# Patient Record
Sex: MALE | Race: WHITE | NOT HISPANIC OR LATINO | Employment: PART TIME | URBAN - METROPOLITAN AREA
[De-identification: names, ages, dates, MRNs, and addresses within clinical notes are randomized per-mention and may not be internally consistent; named-entity substitution may affect disease eponyms.]

---

## 2017-02-09 ENCOUNTER — OFFICE VISIT (OUTPATIENT)
Dept: FAMILY MEDICINE | Facility: HOSPITAL | Age: 57
End: 2017-02-09
Attending: FAMILY MEDICINE
Payer: MEDICAID

## 2017-02-09 VITALS
HEART RATE: 67 BPM | WEIGHT: 239.44 LBS | BODY MASS INDEX: 30.73 KG/M2 | DIASTOLIC BLOOD PRESSURE: 79 MMHG | HEIGHT: 74 IN | SYSTOLIC BLOOD PRESSURE: 128 MMHG

## 2017-02-09 DIAGNOSIS — E78.5 HYPERLIPIDEMIA ASSOCIATED WITH TYPE 2 DIABETES MELLITUS: ICD-10-CM

## 2017-02-09 DIAGNOSIS — Z79.4 TYPE 2 DIABETES MELLITUS WITHOUT COMPLICATION, WITH LONG-TERM CURRENT USE OF INSULIN: Primary | ICD-10-CM

## 2017-02-09 DIAGNOSIS — I15.2 HYPERTENSION ASSOCIATED WITH DIABETES: ICD-10-CM

## 2017-02-09 DIAGNOSIS — E11.9 TYPE 2 DIABETES MELLITUS WITHOUT COMPLICATION, WITH LONG-TERM CURRENT USE OF INSULIN: Primary | ICD-10-CM

## 2017-02-09 DIAGNOSIS — E11.69 HYPERLIPIDEMIA ASSOCIATED WITH TYPE 2 DIABETES MELLITUS: ICD-10-CM

## 2017-02-09 DIAGNOSIS — E11.59 HYPERTENSION ASSOCIATED WITH DIABETES: ICD-10-CM

## 2017-02-09 PROCEDURE — 99213 OFFICE O/P EST LOW 20 MIN: CPT | Performed by: FAMILY MEDICINE

## 2017-02-09 RX ORDER — HYDROCODONE BITARTRATE AND ACETAMINOPHEN 10; 325 MG/1; MG/1
TABLET ORAL
COMMUNITY
Start: 2016-12-05 | End: 2017-05-03

## 2017-02-09 NOTE — PROGRESS NOTES
Subjective:       Patient ID: Med Sam is a 56 y.o. male.    Chief Complaint: Follow-up (6 months check up)    Hypertension   This is a chronic problem. The current episode started more than 1 year ago. The problem is controlled. Pertinent negatives include no anxiety, blurred vision, chest pain, headaches, malaise/fatigue, neck pain, orthopnea, palpitations, peripheral edema, PND, shortness of breath or sweats. Risk factors for coronary artery disease include diabetes mellitus, dyslipidemia and obesity. Past treatments include angiotensin blockers. The current treatment provides significant improvement. There are no compliance problems.  There is no history of chronic renal disease.   Diabetes   He presents for his follow-up diabetic visit. He has type 2 diabetes mellitus. Pertinent negatives for hypoglycemia include no headaches or sweats. Pertinent negatives for diabetes include no blurred vision, no chest pain, no fatigue, no foot paresthesias, no foot ulcerations, no polydipsia, no polyphagia, no polyuria, no visual change, no weakness and no weight loss. There are no hypoglycemic complications. Symptoms are resolved. There are no diabetic complications. Risk factors for coronary artery disease include diabetes mellitus, dyslipidemia, male sex and obesity. Current diabetic treatment includes insulin injections and oral agent (monotherapy). He is compliant with treatment all of the time. He is currently taking insulin at bedtime. His weight is decreasing steadily. He is following a generally healthy diet. When asked about meal planning, he reported none. He has not had a previous visit with a dietitian. He rarely participates in exercise. He monitors blood glucose at home 5+ x per day. Blood glucose monitoring compliance is fair. There is no change in his home blood glucose trend. His breakfast blood glucose range is generally 110-130 mg/dl. His highest blood glucose is 110-130 mg/dl. An ACE  inhibitor/angiotensin II receptor blocker is being taken. He does not see a podiatrist.Eye exam is current.   Hyperlipidemia   This is a chronic problem. The current episode started more than 1 year ago. Recent lipid tests were reviewed and are variable. Exacerbating diseases include diabetes. He has no history of chronic renal disease, hypothyroidism, liver disease or obesity. There are no known factors aggravating his hyperlipidemia. Pertinent negatives include no chest pain or shortness of breath. Current antihyperlipidemic treatment includes statins. The current treatment provides significant improvement of lipids. There are no compliance problems.  Risk factors for coronary artery disease include diabetes mellitus, dyslipidemia, hypertension, male sex and obesity.        Review of Systems   Constitutional: Negative for chills, fatigue, fever, malaise/fatigue and weight loss.   HENT: Negative for hearing loss.    Eyes: Negative for blurred vision and visual disturbance.   Respiratory: Negative for shortness of breath.    Cardiovascular: Negative for chest pain, palpitations, orthopnea and PND.   Endocrine: Negative for polydipsia, polyphagia and polyuria.   Musculoskeletal: Negative for neck pain.   Neurological: Negative for weakness and headaches.   Psychiatric/Behavioral: Negative.    All other systems reviewed and are negative.      Past Medical History   Diagnosis Date    Depression      Dx 3 months ago.    Diabetes mellitus, type 2      Dx at age 46       No family history on file.    Social History     Social History    Marital status:      Spouse name: N/A    Number of children: N/A    Years of education: N/A     Social History Main Topics    Smoking status: Never Smoker    Smokeless tobacco: None    Alcohol use No    Drug use: No    Sexual activity: Not Asked     Other Topics Concern    None     Social History Narrative       Past Surgical History   Procedure Laterality Date     "Cataract extraction Right     Eye surgery       detached retina right eye    Colonoscopy N/A 10/24/2016     Procedure: COLONOSCOPY;  Surgeon: Forrest Damon MD;  Location: [facility];  Service: Endoscopy;  Laterality: N/A;  screening colonoscpy           Current Outpatient Prescriptions:     AFLURIA [id number], PF, 45 mcg (15 mcg x 3)/0.5 mL Syrg, , Disp: , Rfl:     benazepril (LOTENSIN) 5 MG tablet, Take 1 tablet (5 mg total) by mouth once daily., Disp: 30 tablet, Rfl: 3    blood sugar diagnostic Strp, 1 strip by Misc.(Non-Drug; Combo Route) route 2 (two) times daily before meals., Disp: 100 strip, Rfl: 6    blood-glucose meter kit, Use as instructed, Disp: 1 each, Rfl: 0    cholecalciferol, vitamin D3, 1,000 unit capsule, Take 1 capsule (1,000 Units total) by mouth once daily., Disp: 30 capsule, Rfl: 3    citalopram (CELEXA) 20 MG tablet, Take 1 tablet (20 mg total) by mouth once daily., Disp: 360 tablet, Rfl: 0    glimepiride (AMARYL) 4 MG tablet, Take 1 tablet (4 mg total) by mouth before breakfast., Disp: 30 tablet, Rfl: 2    hydrocodone-acetaminophen 10-325mg (NORCO)  mg Tab, , Disp: , Rfl:     insulin glargine (LANTUS SOLOSTAR) 100 unit/mL (3 mL) InPn pen, Inject 40 Units into the skin every evening., Disp: 12 mL, Rfl: 11    lancets (ACCU-CHEK SOFTCLIX LANCETS) Misc, 1 lancet by Misc.(Non-Drug; Combo Route) route 2 (two) times daily before meals., Disp: 100 each, Rfl: 6    lovastatin (MEVACOR) 20 MG tablet, Take 1 tablet (20 mg total) by mouth every evening., Disp: 30 tablet, Rfl: 3    metformin (GLUCOPHAGE) 500 MG tablet, Take 1 tablet (500 mg total) by mouth 2 (two) times daily with meals. Take two tablets by mouth once daily., Disp: 60 tablet, Rfl: 11    mupirocin (BACTROBAN) 2 % ointment, , Disp: , Rfl:     pen needle, diabetic 31 gauge x 1/4" Ndle, 40 Units by Misc.(Non-Drug; Combo Route) route every evening., Disp: 30 each, Rfl: 3    tadalafil (CIALIS) 10 MG tablet, Take 1 tablet " (10 mg total) by mouth daily as needed for Erectile Dysfunction. Max 10 mg in 24 hour period., Disp: 90 tablet, Rfl: 1    Objective:      Vitals:    02/09/17 0919   BP: 128/79   Pulse: 67     Physical Exam   Constitutional: He is oriented to person, place, and time. He appears well-developed and well-nourished.   HENT:   Head: Normocephalic and atraumatic.   Eyes: Conjunctivae and EOM are normal. Pupils are equal, round, and reactive to light.   Neck: Normal range of motion. Neck supple.   Cardiovascular: Normal rate, regular rhythm, normal heart sounds and intact distal pulses.    Pulmonary/Chest: Effort normal and breath sounds normal.   Abdominal: Soft. Bowel sounds are normal.   Musculoskeletal: Normal range of motion.   Neurological: He is alert and oriented to person, place, and time. He has normal reflexes.   Skin: Skin is warm and dry.   Psychiatric: He has a normal mood and affect. His behavior is normal. Judgment and thought content normal.   Nursing note and vitals reviewed.      Assessment:       1. Type 2 diabetes mellitus without complication, with long-term current use of insulin    2. Hypertension associated with diabetes    3. Hyperlipidemia associated with type 2 diabetes mellitus        Plan:       Type 2 diabetes mellitus without complication, with long-term current use of insulin  -     Hemoglobin A1c; Future; Expected date: 2/9/17  -     Microalbumin/creatinine urine ratio; Future; Expected date: 2/9/17  -     Basic metabolic panel; Future; Expected date: 2/9/17    Hypertension associated with diabetes        -    Controlled. Cont current management.    Hyperlipidemia associated with type 2 diabetes mellitus        -    Controlled.  Cont current management.    Return in about 4 weeks (around 3/9/2017).

## 2017-02-20 DIAGNOSIS — E11.69 HYPERLIPIDEMIA ASSOCIATED WITH TYPE 2 DIABETES MELLITUS: ICD-10-CM

## 2017-02-20 DIAGNOSIS — E78.5 HYPERLIPIDEMIA ASSOCIATED WITH TYPE 2 DIABETES MELLITUS: ICD-10-CM

## 2017-02-20 RX ORDER — LOVASTATIN 20 MG/1
TABLET ORAL
Qty: 30 TABLET | Refills: 0 | Status: SHIPPED | OUTPATIENT
Start: 2017-02-20 | End: 2017-05-03 | Stop reason: SDUPTHER

## 2017-03-29 RX ORDER — GLIMEPIRIDE 4 MG/1
4 TABLET ORAL
Qty: 30 TABLET | Refills: 2 | Status: SHIPPED | OUTPATIENT
Start: 2017-03-29 | End: 2017-05-03 | Stop reason: SDUPTHER

## 2017-03-29 RX ORDER — INSULIN GLARGINE 100 [IU]/ML
40 INJECTION, SOLUTION SUBCUTANEOUS NIGHTLY
Qty: 12 ML | Refills: 11 | Status: SHIPPED | OUTPATIENT
Start: 2017-03-29 | End: 2017-05-03 | Stop reason: SDUPTHER

## 2017-04-03 RX ORDER — GLIMEPIRIDE 4 MG/1
TABLET ORAL
Qty: 30 TABLET | Refills: 3 | Status: SHIPPED | OUTPATIENT
Start: 2017-04-03 | End: 2017-05-03

## 2017-04-03 RX ORDER — INSULIN GLARGINE 100 [IU]/ML
INJECTION, SOLUTION SUBCUTANEOUS
Qty: 15 ML | Refills: 0 | Status: SHIPPED | OUTPATIENT
Start: 2017-04-03 | End: 2017-05-03 | Stop reason: SDUPTHER

## 2017-05-03 ENCOUNTER — OFFICE VISIT (OUTPATIENT)
Dept: FAMILY MEDICINE | Facility: HOSPITAL | Age: 57
End: 2017-05-03
Attending: FAMILY MEDICINE
Payer: MEDICAID

## 2017-05-03 DIAGNOSIS — E78.5 HYPERLIPIDEMIA ASSOCIATED WITH TYPE 2 DIABETES MELLITUS: ICD-10-CM

## 2017-05-03 DIAGNOSIS — T75.3XXA MOTION SICKNESS, INITIAL ENCOUNTER: ICD-10-CM

## 2017-05-03 DIAGNOSIS — L24.7 CONTACT DERMATITIS AND ECZEMA DUE TO PLANT: Primary | ICD-10-CM

## 2017-05-03 DIAGNOSIS — M62.830 BACK SPASM: ICD-10-CM

## 2017-05-03 DIAGNOSIS — E11.69 HYPERLIPIDEMIA ASSOCIATED WITH TYPE 2 DIABETES MELLITUS: ICD-10-CM

## 2017-05-03 DIAGNOSIS — E55.9 VITAMIN D INSUFFICIENCY: ICD-10-CM

## 2017-05-03 PROCEDURE — 99213 OFFICE O/P EST LOW 20 MIN: CPT | Performed by: FAMILY MEDICINE

## 2017-05-03 RX ORDER — SCOLOPAMINE TRANSDERMAL SYSTEM 1 MG/1
1 PATCH, EXTENDED RELEASE TRANSDERMAL
Qty: 4 PATCH | Refills: 1 | Status: SHIPPED | OUTPATIENT
Start: 2017-05-03 | End: 2017-05-11 | Stop reason: CLARIF

## 2017-05-03 RX ORDER — PEN NEEDLE, DIABETIC 29 G X1/2"
40 NEEDLE, DISPOSABLE MISCELLANEOUS NIGHTLY
Qty: 30 EACH | Refills: 11 | Status: SHIPPED | OUTPATIENT
Start: 2017-05-03 | End: 2018-05-03

## 2017-05-03 RX ORDER — TIZANIDINE 4 MG/1
4 TABLET ORAL EVERY 8 HOURS
Qty: 21 TABLET | Refills: 0 | Status: SHIPPED | OUTPATIENT
Start: 2017-05-03 | End: 2017-05-16 | Stop reason: SDUPTHER

## 2017-05-03 RX ORDER — METFORMIN HYDROCHLORIDE 500 MG/1
TABLET ORAL
COMMUNITY
Start: 2017-03-26 | End: 2017-05-03 | Stop reason: DRUGHIGH

## 2017-05-03 RX ORDER — GLIMEPIRIDE 4 MG/1
4 TABLET ORAL
Qty: 90 TABLET | Refills: 0 | Status: SHIPPED | OUTPATIENT
Start: 2017-05-03 | End: 2017-07-24 | Stop reason: SDUPTHER

## 2017-05-03 RX ORDER — INSULIN GLARGINE 100 [IU]/ML
30 INJECTION, SOLUTION SUBCUTANEOUS NIGHTLY
Qty: 30 ML | Refills: 3 | Status: SHIPPED | OUTPATIENT
Start: 2017-05-03 | End: 2018-05-03

## 2017-05-03 RX ORDER — METFORMIN HYDROCHLORIDE 750 MG/1
750 TABLET, EXTENDED RELEASE ORAL
Qty: 90 TABLET | Refills: 0 | Status: SHIPPED | OUTPATIENT
Start: 2017-05-03 | End: 2018-05-03

## 2017-05-03 RX ORDER — VIT C/E/ZN/COPPR/LUTEIN/ZEAXAN 250MG-90MG
1000 CAPSULE ORAL DAILY
Qty: 90 CAPSULE | Refills: 0 | Status: SHIPPED | OUTPATIENT
Start: 2017-05-03 | End: 2018-05-03

## 2017-05-03 RX ORDER — LOVASTATIN 20 MG/1
TABLET ORAL
Qty: 90 TABLET | Refills: 3 | Status: SHIPPED | OUTPATIENT
Start: 2017-05-03

## 2017-05-03 RX ORDER — BENAZEPRIL HYDROCHLORIDE 5 MG/1
5 TABLET ORAL DAILY
Qty: 90 TABLET | Refills: 0 | Status: SHIPPED | OUTPATIENT
Start: 2017-05-03 | End: 2018-05-03

## 2017-05-03 RX ORDER — HYDROCORTISONE 25 MG/G
CREAM TOPICAL 2 TIMES DAILY
Qty: 60 G | Refills: 1 | Status: SHIPPED | OUTPATIENT
Start: 2017-05-03 | End: 2017-05-13

## 2017-05-05 ENCOUNTER — TELEPHONE (OUTPATIENT)
Dept: FAMILY MEDICINE | Facility: HOSPITAL | Age: 57
End: 2017-05-05

## 2017-05-11 DIAGNOSIS — T75.3XXA MOTION SICKNESS, INITIAL ENCOUNTER: Primary | ICD-10-CM

## 2017-05-11 RX ORDER — MECLIZINE HYDROCHLORIDE 25 MG/1
TABLET ORAL
Qty: 30 TABLET | Refills: 0 | Status: SHIPPED | OUTPATIENT
Start: 2017-05-11 | End: 2017-07-24 | Stop reason: SDUPTHER

## 2017-05-16 DIAGNOSIS — M62.830 BACK SPASM: ICD-10-CM

## 2017-05-16 RX ORDER — TIZANIDINE 4 MG/1
TABLET ORAL
Qty: 21 TABLET | Refills: 0 | Status: SHIPPED | OUTPATIENT
Start: 2017-05-16 | End: 2017-05-25 | Stop reason: SDUPTHER

## 2017-05-25 DIAGNOSIS — M62.830 BACK SPASM: ICD-10-CM

## 2017-05-26 RX ORDER — TIZANIDINE 4 MG/1
TABLET ORAL
Qty: 21 TABLET | Refills: 0 | Status: SHIPPED | OUTPATIENT
Start: 2017-05-26

## 2017-06-04 DIAGNOSIS — M62.830 BACK SPASM: ICD-10-CM

## 2017-06-12 RX ORDER — TIZANIDINE 4 MG/1
TABLET ORAL
Qty: 21 TABLET | Refills: 0 | OUTPATIENT
Start: 2017-06-12

## 2017-07-24 DIAGNOSIS — T75.3XXA MOTION SICKNESS, INITIAL ENCOUNTER: ICD-10-CM

## 2017-07-24 RX ORDER — GLIMEPIRIDE 4 MG/1
TABLET ORAL
Qty: 30 TABLET | Refills: 0 | OUTPATIENT
Start: 2017-07-24

## 2017-07-24 RX ORDER — GLIMEPIRIDE 4 MG/1
4 TABLET ORAL
Qty: 90 TABLET | Refills: 3 | Status: SHIPPED | OUTPATIENT
Start: 2017-07-24 | End: 2018-07-24

## 2017-07-26 RX ORDER — MECLIZINE HYDROCHLORIDE 25 MG/1
TABLET ORAL
Qty: 30 TABLET | Refills: 0 | Status: SHIPPED | OUTPATIENT
Start: 2017-07-26

## 2017-09-05 VITALS
DIASTOLIC BLOOD PRESSURE: 82 MMHG | HEART RATE: 66 BPM | WEIGHT: 249.81 LBS | HEIGHT: 74 IN | SYSTOLIC BLOOD PRESSURE: 138 MMHG | BODY MASS INDEX: 32.06 KG/M2

## 2017-09-05 NOTE — PROGRESS NOTES
Subjective:       Patient ID: Med Sam is a 56 y.o. male.    Chief Complaint: Follow-up and Rash (R. leg)    Rash   This is a recurrent problem. The rash is characterized by itchiness and scaling. He was exposed to nothing. Pertinent negatives include no fever or vomiting. Past treatments include nothing. His past medical history is significant for eczema. There is no history of allergies, asthma or varicella.   Dizziness:   Onset:  1 to 4 weeks ago  Progression since onset:  Unchanged  Frequency:  Constantly  Pain Scale:  0/10  Dizziness characteristics:  Motion-sicknessno hearing loss, no ear congestion, no ear pain, no fever, no headaches, no tinnitus, no nausea, no vomiting, no diaphoresis, no aural fullness, no weakness, no visual disturbances, no light-headedness, no syncope, no palpitations, no panic, no facial weakness, no slurred speech, no numbness in extremities and no chest pain.  Aggravated by:  Other  Treatments tried:  Nothing     Hypertension   This is a chronic problem. The current episode started more than 1 year ago. The problem is controlled. Pertinent negatives include no anxiety, blurred vision, chest pain, headaches, malaise/fatigue, neck pain, orthopnea, palpitations, peripheral edema, PND, shortness of breath or sweats. Risk factors for coronary artery disease include diabetes mellitus, dyslipidemia and obesity. Past treatments include angiotensin blockers. The current treatment provides significant improvement. There are no compliance problems.  There is no history of chronic renal disease.   Diabetes   He presents for his follow-up diabetic visit. He has type 2 diabetes mellitus. Pertinent negatives for hypoglycemia include no headaches or sweats. Pertinent negatives for diabetes include no blurred vision, no chest pain, no fatigue, no foot paresthesias, no foot ulcerations, no polydipsia, no polyphagia, no polyuria, no visual change, no weakness and no weight loss. There are no  hypoglycemic complications. Symptoms are resolved. There are no diabetic complications. Risk factors for coronary artery disease include diabetes mellitus, dyslipidemia, male sex and obesity. Current diabetic treatment includes insulin injections and oral agent (monotherapy). He is compliant with treatment all of the time. He is currently taking insulin at bedtime. His weight is decreasing steadily. He is following a generally healthy diet. When asked about meal planning, he reported none. He has not had a previous visit with a dietitian. He rarely participates in exercise. He monitors blood glucose at home 5+ x per day. Blood glucose monitoring compliance is fair. There is no change in his home blood glucose trend. His breakfast blood glucose range is generally 110-130 mg/dl. His highest blood glucose is 110-130 mg/dl. An ACE inhibitor/angiotensin II receptor blocker is being taken. He does not see a podiatrist.Eye exam is current.   Hyperlipidemia   This is a chronic problem. The current episode started more than 1 year ago. Recent lipid tests were reviewed and are variable. Exacerbating diseases include diabetes. He has no history of chronic renal disease, hypothyroidism, liver disease or obesity. There are no known factors aggravating his hyperlipidemia. Pertinent negatives include no chest pain or shortness of breath. Current antihyperlipidemic treatment includes statins. The current treatment provides significant improvement of lipids. There are no compliance problems.  Risk factors for coronary artery disease include diabetes mellitus, dyslipidemia, hypertension, male sex and obesity.        Review of Systems   Constitutional: Negative for diaphoresis and fever.   HENT: Negative for ear pain, hearing loss and tinnitus.    Cardiovascular: Negative for chest pain, palpitations and syncope.   Gastrointestinal: Negative for nausea and vomiting.   Skin: Positive for rash.   Neurological: Positive for dizziness.  Negative for weakness, light-headedness and headaches.       Constitutional: Negative for chills, fatigue, fever, malaise/fatigue and weight loss.   HENT: Negative for hearing loss.    Eyes: Negative for blurred vision and visual disturbance.   Respiratory: Negative for shortness of breath.    Cardiovascular: Negative for chest pain, palpitations, orthopnea and PND.   Endocrine: Negative for polydipsia, polyphagia and polyuria.   Musculoskeletal: back pain. Negative for neck pain.   Neurological: Negative for weakness and headaches.   Psychiatric/Behavioral: Negative.    All other systems reviewed and are negative.    Objective:      Vitals:    05/03/17 1428   BP: 138/82   Pulse: 66     Physical Exam   Constitutional: He is oriented to person, place, and time. He appears well-developed and well-nourished.   HENT:   Head: Normocephalic and atraumatic.   Right Ear: External ear normal.   Left Ear: External ear normal.   Nose: Nose normal.   Mouth/Throat: Oropharynx is clear and moist.   Eyes: Conjunctivae are normal. Pupils are equal, round, and reactive to light.   Neck: Normal range of motion. Neck supple.   Cardiovascular: Normal rate, regular rhythm, normal heart sounds and intact distal pulses.    Pulmonary/Chest: Effort normal and breath sounds normal.   Abdominal: Soft. Bowel sounds are normal.   Musculoskeletal: Normal range of motion.   Neurological: He is alert and oriented to person, place, and time. He has normal strength. He displays normal reflexes. No cranial nerve deficit or sensory deficit. He exhibits normal muscle tone. He displays a negative Romberg sign. Coordination and gait normal.   Skin: Skin is warm and dry.   Psychiatric: He has a normal mood and affect. His behavior is normal. Judgment and thought content normal.         Assessment:       1. Contact dermatitis and eczema due to plant    2. Uncontrolled type 2 diabetes mellitus without complication, without long-term current use of insulin   "  3. Motion sickness, initial encounter    4. Vitamin D insufficiency    5. Hyperlipidemia associated with type 2 diabetes mellitus    6. Back spasm        Plan:       Contact dermatitis and eczema due to plant  -     hydrocortisone 2.5 % cream; Apply topically 2 (two) times daily. Apply to affected area  Dispense: 60 g; Refill: 1  -     calamine lotion; Apply topically as needed.  Dispense: 120 mL; Refill: 0    Uncontrolled type 2 diabetes mellitus without complication, without long-term current use of insulin  -     insulin glargine (LANTUS SOLOSTAR) 100 unit/mL (3 mL) InPn pen; Inject 30 Units into the skin every evening. Plus additional insulin as directed  Dispense: 30 mL; Refill: 3  -     benazepril (LOTENSIN) 5 MG tablet; Take 1 tablet (5 mg total) by mouth once daily.  Dispense: 90 tablet; Refill: 0  -    glimepiride (AMARYL) 4 MG tablet; Take 1 tablet (4 mg total) by mouth before breakfast.  Dispense: 90 tablet; Refill: 0  -     pen needle, diabetic 31 gauge x 1/4" Ndle; Inject 40 Units into the skin every evening.  Dispense: 30 each; Refill: 11  -     metformin (GLUCOPHAGE-XR) 750 MG 24 hr tablet; Take 1 tablet (750 mg total) by mouth daily with breakfast.  Dispense: 90 tablet; Refill: 0    Motion sickness, initial encounter  -      scopolamine (TRANSDERM-SCOP) 1.5 mg (1 mg over 3 days); Place 1 patch (1.5 mg total) onto the skin every 72 hours.  Dispense: 4 patch; Refill: 1    Vitamin D insufficiency  -     cholecalciferol, vitamin D3, 1,000 unit capsule; Take 1 capsule (1,000 Units total) by mouth once daily.  Dispense: 90 capsule; Refill: 0    Hyperlipidemia associated with type 2 diabetes mellitus  -     lovastatin (MEVACOR) 20 MG tablet; TAKE ONE TABLET BY MOUTH ONCE DAILY IN THE EVENING  Dispense: 90 tablet; Refill: 3    Back spasm  -    tizanidine (ZANAFLEX) 4 MG tablet; Take 1 tablet (4 mg total) by mouth every 8 (eight) hours.  Dispense: 21 tablet; Refill: 0    Goal BP<140/90  Goal A1C <7.0  Goal " BMI <30    A total of 25 minutes were spent face-to-face with the patient during this encounter and over half of that time was spent on counseling and coordination of care. We discussed in depth the importance of adherence diabetic low salt diet and exercise. I also educated the patient about lifestyle modifications which may improve blood pressure.           Return in about 3 months (around 8/3/2017).

## 2024-06-19 ENCOUNTER — TELEPHONE (OUTPATIENT)
Dept: FAMILY MEDICINE | Facility: HOSPITAL | Age: 64
End: 2024-06-19
Payer: COMMERCIAL

## 2024-06-19 NOTE — TELEPHONE ENCOUNTER
Msg left ----- Message from Bianca Fuller sent at 6/18/2024  2:53 PM CDT -----  Type:  Apt Request     Who Called: Pt's daughter in law   Does the patient know what this is regarding?: needs apt for memory loss, alzheimers runs in family   Would the patient rather a call back or a response via [facility]? Call   Best Call Back Number: [phone] Marlena Caesar, daughter in law  Additional Information:

## 2024-06-21 ENCOUNTER — TELEPHONE (OUTPATIENT)
Dept: FAMILY MEDICINE | Facility: HOSPITAL | Age: 64
End: 2024-06-21

## 2024-06-21 NOTE — TELEPHONE ENCOUNTER
Msg left : He can be scheduled on 6/26 ----- Message from Onelia Staley sent at 6/21/2024  9:35 AM CDT -----  Regarding: reschedule  Type:  reschedule    Who Called: pt's daughter   Would the patient rather a call back or a response via [facility]? Call  Best Call Back Number: [phone]  Additional Information: calling in regards to needing to reschedule appt

## 2024-07-08 ENCOUNTER — OFFICE VISIT (OUTPATIENT)
Dept: FAMILY MEDICINE | Facility: HOSPITAL | Age: 64
End: 2024-07-08
Attending: FAMILY MEDICINE
Payer: MEDICAID

## 2024-07-08 VITALS
WEIGHT: 218.5 LBS | OXYGEN SATURATION: 98 % | SYSTOLIC BLOOD PRESSURE: 98 MMHG | BODY MASS INDEX: 28.04 KG/M2 | DIASTOLIC BLOOD PRESSURE: 68 MMHG | HEIGHT: 74 IN | HEART RATE: 60 BPM

## 2024-07-08 DIAGNOSIS — R53.83 FATIGUE, UNSPECIFIED TYPE: ICD-10-CM

## 2024-07-08 DIAGNOSIS — R68.82 DECREASED LIBIDO: ICD-10-CM

## 2024-07-08 DIAGNOSIS — R41.3 MEMORY LOSS OF UNKNOWN CAUSE: ICD-10-CM

## 2024-07-08 DIAGNOSIS — E11.9 TYPE 2 DIABETES MELLITUS WITHOUT COMPLICATION, UNSPECIFIED WHETHER LONG TERM INSULIN USE: Primary | ICD-10-CM

## 2024-07-08 DIAGNOSIS — I10 HYPERTENSION, UNSPECIFIED TYPE: ICD-10-CM

## 2024-07-08 DIAGNOSIS — Z11.4 SCREENING FOR HIV WITHOUT PRESENCE OF RISK FACTORS: ICD-10-CM

## 2024-07-08 DIAGNOSIS — Z11.59 NEED FOR HEPATITIS C SCREENING TEST: ICD-10-CM

## 2024-07-08 DIAGNOSIS — R41.3 OTHER AMNESIA: ICD-10-CM

## 2024-07-08 PROCEDURE — 99215 OFFICE O/P EST HI 40 MIN: CPT | Performed by: FAMILY MEDICINE

## 2024-07-08 RX ORDER — PEN NEEDLE, DIABETIC 30 GX3/16"
NEEDLE, DISPOSABLE MISCELLANEOUS
COMMUNITY
End: 2025-03-25

## 2024-07-08 RX ORDER — INSULIN GLARGINE 300 U/ML
INJECTION, SOLUTION SUBCUTANEOUS
COMMUNITY
End: 2024-08-19 | Stop reason: SDUPTHER

## 2024-07-08 RX ORDER — ROSUVASTATIN CALCIUM 40 MG/1
1 TABLET, COATED ORAL DAILY
COMMUNITY
Start: 2024-06-21

## 2024-07-08 RX ORDER — DUTASTERIDE 0.5 MG/1
0.5 CAPSULE, LIQUID FILLED ORAL
COMMUNITY
Start: 2024-06-24

## 2024-07-08 RX ORDER — AMLODIPINE BESYLATE 10 MG/1
1 TABLET ORAL DAILY
COMMUNITY
Start: 2024-06-21 | End: 2024-08-09

## 2024-07-08 RX ORDER — PIOGLITAZONE 30 MG/1
1 TABLET ORAL DAILY
COMMUNITY
Start: 2024-06-21 | End: 2025-03-20

## 2024-07-08 RX ORDER — CITALOPRAM 20 MG/1
1 TABLET ORAL DAILY
COMMUNITY
Start: 2024-06-21 | End: 2025-03-20

## 2024-07-08 RX ORDER — METFORMIN HYDROCHLORIDE 750 MG/1
1 TABLET, EXTENDED RELEASE ORAL DAILY
COMMUNITY
Start: 2024-06-21

## 2024-07-08 RX ORDER — LISINOPRIL 40 MG/1
1 TABLET ORAL DAILY
COMMUNITY
Start: 2024-06-21 | End: 2024-10-18 | Stop reason: SDUPTHER

## 2024-07-08 NOTE — PROGRESS NOTES
Subjective:       Patient ID: Med Sam is a 64 y.o. male.    Chief Complaint: Establish Care, Diabetes, and Memory Loss    HPI  Subjective:       Med Sam is a 64 y.o. male here for evaluation of memory problems. He is accompanied by daughter. Primary caregiver is patient. Patient lives with their spouse. The family and the patient identify problems with changes in short term memory and recalling words. Family and patient report problems with agitation. Family and patient are concerned about  memory, however, they are not concerned about medication errors, wandering, driving, cooking, and inability to maintain adequate nutrition. Medication administration: patient self medicates.        Review of Systems      Past Medical History:   Diagnosis Date    Depression     Dx 3 months ago.    Diabetes mellitus, type 2     Dx at age 46       No family history on file.    Social History     Socioeconomic History    Marital status:    Tobacco Use    Smoking status: Never   Substance and Sexual Activity    Alcohol use: No    Drug use: No     Social Determinants of Health     Financial Resource Strain: Low Risk  (7/2/2024)    Overall Financial Resource Strain (CARDIA)     Difficulty of Paying Living Expenses: Not very hard   Food Insecurity: No Food Insecurity (7/2/2024)    Hunger Vital Sign     Worried About Running Out of Food in the Last Year: Never true     Ran Out of Food in the Last Year: Never true   Transportation Needs: No Transportation Needs (11/17/2022)    Received from [facility] Transportation     Lack of Transportation (Medical): No     Lack of Transportation (Non-Medical): No   Physical Activity: Unknown (7/2/2024)    Exercise Vital Sign     Days of Exercise per Week: 2 days   Stress: No Stress Concern Present (7/2/2024)    [employer] - Occupational Stress Questionnaire     Feeling of Stress : Only a little   Housing Stability: Unknown (7/2/2024)     "Housing Stability Vital Sign     Unable to Pay for Housing in the Last Year: No       Past Surgical History:   Procedure Laterality Date    CATARACT EXTRACTION Right     COLONOSCOPY N/A 10/24/2016    Procedure: COLONOSCOPY;  Surgeon: Forrest Damon MD;  Location: [facility];  Service: Endoscopy;  Laterality: N/A;  screening colonoscpy      EYE SURGERY      detached retina right eye         Current Outpatient Medications:     amLODIPine (NORVASC) 10 MG tablet, Take 1 tablet by mouth once daily., Disp: , Rfl:     blood sugar diagnostic Strp, 1 strip by Misc.(Non-Drug; Combo Route) route 2 (two) times daily before meals., Disp: 100 strip, Rfl: 6    citalopram (CELEXA) 20 MG tablet, Take 1 tablet by mouth once daily., Disp: , Rfl:     dutasteride (AVODART) 0.5 mg capsule, Take 0.5 mg by mouth., Disp: , Rfl:     insulin NPH-insulin regular, 70/30, 100 unit/mL (70-30) injection, Inject 30 Units into the skin., Disp: , Rfl:     lancets (ACCU-CHEK SOFTCLIX LANCETS) Misc, 1 lancet by Misc.(Non-Drug; Combo Route) route 2 (two) times daily before meals., Disp: 100 each, Rfl: 6    lisinopriL (PRINIVIL,ZESTRIL) 40 MG tablet, Take 1 tablet by mouth once daily., Disp: , Rfl:     meclizine (ANTIVERT) 25 mg tablet, TAKE 1-2 TABLETS BY MOUTH 1 HOUR BEFORE TRAVEL REPEAT DOSE EVERY 24 HOURS IF NEEDED, Disp: 30 tablet, Rfl: 0    metFORMIN (GLUCOPHAGE-XR) 750 MG ER 24hr tablet, Take 1 tablet by mouth once daily., Disp: , Rfl:     mupirocin (BACTROBAN) 2 % ointment, , Disp: , Rfl:     pen needle, diabetic (BD ULTRA-FINE MINI PEN NEEDLE) 31 gauge x 3/16" Ndle, , Disp: , Rfl:     pen needle, diabetic 31 gauge x 3/16" Ndle, BD Ultra-Fine Mini Pen Needle 31 gauge x 3/16", Disp: , Rfl:     pioglitazone (ACTOS) 30 MG tablet, Take 1 tablet by mouth once daily., Disp: , Rfl:     rosuvastatin (CRESTOR) 40 MG Tab, Take 1 tablet by mouth once daily., Disp: , Rfl:     TOUJEO SOLOSTAR U-300 INSULIN 300 unit/mL (1.5 mL) InPn pen, INJECT 20 UNITS " "SUBCUTANEOUSLY ONCE DAILY, Disp: , Rfl:     benazepril (LOTENSIN) 5 MG tablet, Take 1 tablet (5 mg total) by mouth once daily., Disp: 90 tablet, Rfl: 0    blood-glucose meter kit, Use as instructed, Disp: 1 each, Rfl: 0    calamine lotion, Apply topically as needed. (Patient not taking: Reported on 7/8/2024), Disp: 120 mL, Rfl: 0    glimepiride (AMARYL) 4 MG tablet, Take 1 tablet (4 mg total) by mouth before breakfast., Disp: 90 tablet, Rfl: 3    hydrocortisone 2.5 % cream, Apply topically 2 (two) times daily. Apply to affected area, Disp: 60 g, Rfl: 1    lovastatin (MEVACOR) 20 MG tablet, TAKE ONE TABLET BY MOUTH ONCE DAILY IN THE EVENING (Patient not taking: Reported on 7/8/2024), Disp: 90 tablet, Rfl: 3    tadalafil (CIALIS) 10 MG tablet, Take 1 tablet (10 mg total) by mouth daily as needed for Erectile Dysfunction. Max 10 mg in 24 hour period., Disp: 90 tablet, Rfl: 1    tizanidine (ZANAFLEX) 4 MG tablet, TAKE ONE TABLET BY MOUTH EVERY 8 HOURS (Patient not taking: Reported on 7/8/2024), Disp: 21 tablet, Rfl: 0    Checklist of Daily Activities:   independent for UE/LE dressing, toileting, brush teeth, and washface with no assistive devices.  Able to preform shopping for groceries, driving or using public transportation, using the telephone, meal preparation, housework, home repair, laundry, taking medications, handling finances.      Objective:      Body mass index is 28.05 kg/m².  Vitals:    07/08/24 1004   BP: 98/68   Pulse: 60   SpO2: 98%   Weight: 99.1 kg (218 lb 7.6 oz)   Height: 6' 2" (1.88 m)   PainSc: 0-No pain     Physical Exam  Constitutional:       Appearance: Normal appearance.   HENT:      Head: Normocephalic and atraumatic.      Nose: Nose normal.   Cardiovascular:      Rate and Rhythm: Normal rate and regular rhythm.      Pulses: Normal pulses.      Heart sounds: Normal heart sounds.   Pulmonary:      Effort: Pulmonary effort is normal.      Breath sounds: Normal breath sounds.   Musculoskeletal:   "       General: Normal range of motion.   Skin:     General: Skin is warm and dry.      Capillary Refill: Capillary refill takes less than 2 seconds.   Neurological:      General: No focal deficit present.      Mental Status: He is alert.      Cranial Nerves: Cranial nerves 2-12 are intact.      Motor: Motor function is intact.      Coordination: Coordination is intact.      Gait: Gait is intact.   Psychiatric:         Cognition and Memory: Memory is impaired.               ISABELLA COGNITIVE ASSESSMENT (MOCA)  Visuospatial/Executive   Points   Alternating Trail: Done Cube:  Done    Clock: Done Score: 1   Naming     Pictured animals:  Lion, Rhino, Camel   3/3        Memory Read list of words.  Subject must repeat them.    Do 2 trials.  Do a recall after 5 minutes No  Points      FACE   VELVET   Sabianism   KELSEY   RED    1st Trial yes yes yes yes yes    2nd Trial         Attention     Read list of digits (1 digit / sec)  Subject has to repeat them in the forward order.             2 1 8 5 4   Subject has to repeat them in the backward order           7 4 2  2/2     Read list of letters.  The subject must tap with his hand at each letter A.  No points if > 2 errors.  ctlpar     F  B  A  C  M  N  A  A  J  K  L  B  A  F  A  K  D  E  A  A  A  J  A  M  O  F  A  A   B   1/1   Serial 7 subtraction starting at 100:  93          86          79         72          65  4 or 5 correct subtractions: 3 pts, 2 or 3 correct: 2 pts,  1 correct: 1 pt, 0 correct: 0 pt   0/3   Language Repeat:  I only know that Anthony is the one to help today  The cat always hid under the couch when dogs were in the room 2/2     Fluency / Name maximum number of words in one minute that begin with the letter F     0/1   Abstraction Similarity between e.g. banana - orange = fruit          Train - Bicycle                   Watch - Ruler 2/2   Delayed Recall Points for UNCUED recall only    Has to recall words   WITH NO CUE FACE VELVET Sabianism KELSEY RED   0/5    Optional Category cue Done Done Done Done Done     Multiple choice cue Done Done Done Done Done    Orientation Date [Done]         Month [Done]         Year [Done]     Day  [Done]         Place    [Done]         City  [Done] 3/6        .moca  Assessment:       1. Type 2 diabetes mellitus without complication, unspecified whether long term insulin use    2. Screening for HIV without presence of risk factors    3. Need for hepatitis C screening test    4. Memory loss of unknown cause    5. Hypertension, unspecified type    6. Fatigue, unspecified type    7. Other amnesia    8. Decreased libido        Plan:         Counseled patient and family regarding the diagnosis of possible dementia and progression.  Provided packet of materials assembled for patients and families with more information about dementia and resources to assist with coping.  All questions answered fully.    Neuropsychiatric testing to be ordered after labs and MRI.    Type 2 diabetes mellitus without complication, unspecified whether long term insulin use  -     Lipid Panel; Future; Expected date: 07/08/2024  -     Hemoglobin A1C; Future; Expected date: 07/08/2024  -     Cancel: Microalbumin/creatinine urine ratio  -     Cancel: Microalbumin/creatinine urine ratio; Future; Expected date: 07/09/2024  -     Microalbumin/creatinine urine ratio; Future; Expected date: 08/01/2024    Screening for HIV without presence of risk factors  -     HIV 1/2 Ag/Ab (4th Gen); Future; Expected date: 07/08/2024    Need for hepatitis C screening test  -     Hepatitis C Antibody; Future; Expected date: 07/08/2024    Memory loss of unknown cause  -     Vitamin B12; Future; Expected date: 07/08/2024  -     Folate; Future; Expected date: 07/08/2024  -     FTA antibodies, IgG and IgM; Future; Expected date: 07/08/2024  -     TSH; Future; Expected date: 07/08/2024  -     MRI Brain Without Contrast; Future; Expected date: 07/08/2024    Hypertension, unspecified  type    Fatigue, unspecified type  -     Vitamin B12; Future; Expected date: 07/08/2024  -     Folate; Future; Expected date: 07/08/2024  -     TSH; Future; Expected date: 07/08/2024  -     Testosterone, Total, Males; Future; Expected date: 07/08/2024    Other amnesia  -     MRI Brain Without Contrast; Future; Expected date: 07/08/2024    Decreased libido      Follow up in about 4 weeks (around 8/5/2024) for Memory issue.        Goal BP<140/90  Goal A1C <7.0  Goal BMI <30    General weight loss/lifestyle modification strategies discussed (elicit support from others; identify saboteurs; non-food rewards, etc).  Informal exercise measures discussed, e.g. taking stairs instead of elevator.  Regular aerobic exercise program discussed.    A total of 30 minutes were spent face-to-face with the patient during this encounter and over half of that time was spent on counseling and coordination of care. We discussed in depth the importance of adherence diabetic low salt diet and exercise. I also educated the patient about lifestyle modifications which may improve blood pressure.

## 2024-07-12 ENCOUNTER — LAB VISIT (OUTPATIENT)
Dept: LAB | Facility: HOSPITAL | Age: 64
End: 2024-07-12
Attending: FAMILY MEDICINE
Payer: MEDICAID

## 2024-07-12 DIAGNOSIS — Z11.59 NEED FOR HEPATITIS C SCREENING TEST: ICD-10-CM

## 2024-07-12 DIAGNOSIS — R41.3 MEMORY LOSS OF UNKNOWN CAUSE: ICD-10-CM

## 2024-07-12 DIAGNOSIS — R53.83 FATIGUE, UNSPECIFIED TYPE: ICD-10-CM

## 2024-07-12 DIAGNOSIS — Z11.4 SCREENING FOR HIV WITHOUT PRESENCE OF RISK FACTORS: ICD-10-CM

## 2024-07-12 DIAGNOSIS — E11.9 TYPE 2 DIABETES MELLITUS WITHOUT COMPLICATION, UNSPECIFIED WHETHER LONG TERM INSULIN USE: ICD-10-CM

## 2024-07-12 LAB
CHOLEST SERPL-MCNC: 139 MG/DL (ref 120–199)
CHOLEST/HDLC SERPL: 2.8 {RATIO} (ref 2–5)
ESTIMATED AVG GLUCOSE: 146 MG/DL (ref 68–131)
HBA1C MFR BLD: 6.7 % (ref 4–5.6)
HDLC SERPL-MCNC: 50 MG/DL (ref 40–75)
HDLC SERPL: 36 % (ref 20–50)
LDLC SERPL CALC-MCNC: 61.2 MG/DL (ref 63–159)
NONHDLC SERPL-MCNC: 89 MG/DL
TRIGL SERPL-MCNC: 139 MG/DL (ref 30–150)
TSH SERPL DL<=0.005 MIU/L-ACNC: 0.73 UIU/ML (ref 0.4–4)

## 2024-07-12 PROCEDURE — 36415 COLL VENOUS BLD VENIPUNCTURE: CPT | Performed by: FAMILY MEDICINE

## 2024-07-12 PROCEDURE — 84443 ASSAY THYROID STIM HORMONE: CPT | Performed by: FAMILY MEDICINE

## 2024-07-12 PROCEDURE — 80061 LIPID PANEL: CPT | Performed by: FAMILY MEDICINE

## 2024-07-12 PROCEDURE — 87389 HIV-1 AG W/HIV-1&-2 AB AG IA: CPT | Performed by: FAMILY MEDICINE

## 2024-07-12 PROCEDURE — 82607 VITAMIN B-12: CPT | Performed by: FAMILY MEDICINE

## 2024-07-12 PROCEDURE — 86803 HEPATITIS C AB TEST: CPT | Performed by: FAMILY MEDICINE

## 2024-07-12 PROCEDURE — 83036 HEMOGLOBIN GLYCOSYLATED A1C: CPT | Performed by: FAMILY MEDICINE

## 2024-07-12 PROCEDURE — 82746 ASSAY OF FOLIC ACID SERUM: CPT | Performed by: FAMILY MEDICINE

## 2024-07-13 LAB
FOLATE SERPL-MCNC: 14.2 NG/ML (ref 4–24)
HCV AB SERPL QL IA: NORMAL
HIV 1+2 AB+HIV1 P24 AG SERPL QL IA: NORMAL
VIT B12 SERPL-MCNC: 420 PG/ML (ref 210–950)

## 2024-07-15 ENCOUNTER — HOSPITAL ENCOUNTER (OUTPATIENT)
Dept: RADIOLOGY | Facility: HOSPITAL | Age: 64
Discharge: HOME | End: 2024-07-15
Attending: FAMILY MEDICINE
Payer: MEDICAID

## 2024-07-15 DIAGNOSIS — R41.3 MEMORY LOSS OF UNKNOWN CAUSE: ICD-10-CM

## 2024-07-15 DIAGNOSIS — R41.3 OTHER AMNESIA: ICD-10-CM

## 2024-07-15 PROCEDURE — 70551 MRI BRAIN STEM W/O DYE: CPT | Mod: 26,,, | Performed by: RADIOLOGY

## 2024-07-15 PROCEDURE — 70551 MRI BRAIN STEM W/O DYE: CPT | Mod: TC

## 2024-08-01 ENCOUNTER — LAB VISIT (OUTPATIENT)
Dept: LAB | Facility: HOSPITAL | Age: 64
End: 2024-08-01
Attending: FAMILY MEDICINE
Payer: MEDICAID

## 2024-08-01 ENCOUNTER — OFFICE VISIT (OUTPATIENT)
Dept: FAMILY MEDICINE | Facility: HOSPITAL | Age: 64
End: 2024-08-01
Attending: FAMILY MEDICINE
Payer: MEDICAID

## 2024-08-01 VITALS
SYSTOLIC BLOOD PRESSURE: 79 MMHG | BODY MASS INDEX: 27.81 KG/M2 | HEART RATE: 57 BPM | DIASTOLIC BLOOD PRESSURE: 47 MMHG | WEIGHT: 216.69 LBS | HEIGHT: 74 IN | OXYGEN SATURATION: 99 %

## 2024-08-01 DIAGNOSIS — F01.50: ICD-10-CM

## 2024-08-01 DIAGNOSIS — I10 HYPERTENSION, UNSPECIFIED TYPE: ICD-10-CM

## 2024-08-01 DIAGNOSIS — I61.9 CVA (CEREBROVASCULAR ACCIDENT DUE TO INTRACEREBRAL HEMORRHAGE): Primary | ICD-10-CM

## 2024-08-01 DIAGNOSIS — I95.1 ORTHOSTATIC HYPOTENSION: ICD-10-CM

## 2024-08-01 DIAGNOSIS — Z11.3 SCREENING EXAMINATION FOR STI: ICD-10-CM

## 2024-08-01 DIAGNOSIS — E11.9 TYPE 2 DIABETES MELLITUS WITHOUT COMPLICATION, UNSPECIFIED WHETHER LONG TERM INSULIN USE: ICD-10-CM

## 2024-08-01 LAB
ALBUMIN/CREAT UR: 18.9 UG/MG (ref 0–30)
CREAT UR-MCNC: 175 MG/DL (ref 23–375)
MICROALBUMIN UR DL<=1MG/L-MCNC: 33 UG/ML

## 2024-08-01 PROCEDURE — 99215 OFFICE O/P EST HI 40 MIN: CPT | Performed by: FAMILY MEDICINE

## 2024-08-01 PROCEDURE — 82570 ASSAY OF URINE CREATININE: CPT | Performed by: FAMILY MEDICINE

## 2024-08-01 PROCEDURE — 82043 UR ALBUMIN QUANTITATIVE: CPT | Performed by: FAMILY MEDICINE

## 2024-08-01 NOTE — PROGRESS NOTES
Subjective:       Patient ID: Med Sam is a 63 y.o. male.    Chief Complaint: Abdominal Pain, disability request, and Results    HPI    HPI:  Mr. Med Sam presents for a 1-month follow-up after prior evaluation for intracerebral hemorrhagic stroke, progressive memory decline, and orthostatic hypotension. Since the last visit, he reports no new neurological deficits. He continues to live independently and has not had any falls, disorientation, or weakness. His daughter confirms ongoing mild memory issues, but no acute behavioral changes.    He reports dizziness, and he has not had further episodes of lightheadedness upon standing. Blood pressure at home has remained stable.    He is awaiting neurology follow-up scheduled for 08/08/2024. No acute concerns at this time. He confirms that the Treponema antibody lab was not completed and saline bubble echocardiogram is still pending.    He remains on Lisinopril for hypertension and Toujeo insulin therapy for diabetes. No recent episodes of hypoglycemia. Continues using Dexcom G7 for glucose monitoring.      Review of Systems      Constitutional: No fevers, chills, or weight changes  Neurologic: No dizziness, falls, or focal symptoms; memory decline noted  Cardiovascular: No chest pain or palpitations; improved lightheadedness  Respiratory: No shortness of breath or cough  GI/: Voiding well; no dysuria or incontinence  Psych: Stable mood; no suicidal ideation  Endocrine: No symptoms of hyper- or hypoglycemia    Past Medical History:   Diagnosis Date    Depression     Dx 3 months ago.    Diabetes mellitus, type 2     Dx at age 46       No family history on file.    Social History     Socioeconomic History    Marital status:    Tobacco Use    Smoking status: Never   Substance and Sexual Activity    Alcohol use: No    Drug use: No     Social Determinants of Health     Financial Resource Strain: Low Risk  (7/2/2024)    Overall Financial Resource Strain  "(CARDIA)     Difficulty of Paying Living Expenses: Not very hard   Food Insecurity: No Food Insecurity (7/2/2024)    Hunger Vital Sign     Worried About Running Out of Food in the Last Year: Never true     Ran Out of Food in the Last Year: Never true   Transportation Needs: No Transportation Needs (11/17/2022)    Received from [facility] - Transportation     Lack of Transportation (Medical): No     Lack of Transportation (Non-Medical): No   Physical Activity: Unknown (7/2/2024)    Exercise Vital Sign     Days of Exercise per Week: 2 days   Stress: No Stress Concern Present (7/2/2024)    [employer] - Occupational Stress Questionnaire     Feeling of Stress : Only a little   Housing Stability: Unknown (7/2/2024)    Housing Stability Vital Sign     Unable to Pay for Housing in the Last Year: No       Past Surgical History:   Procedure Laterality Date    CATARACT EXTRACTION Right     COLONOSCOPY N/A 10/24/2016    Procedure: COLONOSCOPY;  Surgeon: Forrest Damon MD;  Location: [facility];  Service: Endoscopy;  Laterality: N/A;  screening colonoscpy      EYE SURGERY      detached retina right eye         Current Outpatient Medications:     amLODIPine (NORVASC) 10 MG tablet, Take 1 tablet by mouth once daily., Disp: , Rfl:     lisinopriL (PRINIVIL,ZESTRIL) 40 MG tablet, Take 1 tablet by mouth once daily., Disp: , Rfl:     metFORMIN (GLUCOPHAGE-XR) 750 MG ER 24hr tablet, Take 1 tablet by mouth once daily., Disp: , Rfl:     pen needle, diabetic (BD ULTRA-FINE MINI PEN NEEDLE) 31 gauge x 3/16" Ndle, , Disp: , Rfl:     pen needle, diabetic 31 gauge x 3/16" Ndle, BD Ultra-Fine Mini Pen Needle 31 gauge x 3/16", Disp: , Rfl:     pioglitazone (ACTOS) 30 MG tablet, Take 1 tablet by mouth once daily., Disp: , Rfl:     rosuvastatin (CRESTOR) 40 MG Tab, Take 1 tablet by mouth once daily., Disp: , Rfl:     TOUJEO SOLOSTAR U-300 INSULIN 300 unit/mL (1.5 mL) InPn pen, INJECT 20 UNITS " SUBCUTANEOUSLY ONCE DAILY, Disp: , Rfl:     benazepril (LOTENSIN) 5 MG tablet, Take 1 tablet (5 mg total) by mouth once daily., Disp: 90 tablet, Rfl: 0    blood sugar diagnostic Strp, 1 strip by Misc.(Non-Drug; Combo Route) route 2 (two) times daily before meals. (Patient not taking: Reported on 8/1/2024), Disp: 100 strip, Rfl: 6    blood-glucose meter kit, Use as instructed, Disp: 1 each, Rfl: 0    calamine lotion, Apply topically as needed. (Patient not taking: Reported on 7/8/2024), Disp: 120 mL, Rfl: 0    citalopram (CELEXA) 20 MG tablet, Take 1 tablet by mouth once daily., Disp: , Rfl:     dutasteride (AVODART) 0.5 mg capsule, Take 0.5 mg by mouth., Disp: , Rfl:     glimepiride (AMARYL) 4 MG tablet, Take 1 tablet (4 mg total) by mouth before breakfast., Disp: 90 tablet, Rfl: 3    hydrocortisone 2.5 % cream, Apply topically 2 (two) times daily. Apply to affected area, Disp: 60 g, Rfl: 1    insulin NPH-insulin regular, 70/30, 100 unit/mL (70-30) injection, Inject 30 Units into the skin. (Patient not taking: Reported on 8/1/2024), Disp: , Rfl:     lancets (ACCU-CHEK SOFTCLIX LANCETS) Misc, 1 lancet by Misc.(Non-Drug; Combo Route) route 2 (two) times daily before meals. (Patient not taking: Reported on 8/1/2024), Disp: 100 each, Rfl: 6    lovastatin (MEVACOR) 20 MG tablet, TAKE ONE TABLET BY MOUTH ONCE DAILY IN THE EVENING (Patient not taking: Reported on 7/8/2024), Disp: 90 tablet, Rfl: 3    meclizine (ANTIVERT) 25 mg tablet, TAKE 1-2 TABLETS BY MOUTH 1 HOUR BEFORE TRAVEL REPEAT DOSE EVERY 24 HOURS IF NEEDED, Disp: 30 tablet, Rfl: 0    mupirocin (BACTROBAN) 2 % ointment, , Disp: , Rfl:     tadalafil (CIALIS) 10 MG tablet, Take 1 tablet (10 mg total) by mouth daily as needed for Erectile Dysfunction. Max 10 mg in 24 hour period., Disp: 90 tablet, Rfl: 1    tizanidine (ZANAFLEX) 4 MG tablet, TAKE ONE TABLET BY MOUTH EVERY 8 HOURS (Patient not taking: Reported on 7/8/2024), Disp: 21 tablet, Rfl: 0    Objective:     "  Body mass index is 27.82 kg/m².  Vitals:    08/01/24 0905   BP: (!) 150/63   Pulse: (!) 57   SpO2: 99%   Weight: 98.3 kg (216 lb 11.4 oz)   Height: 6' 2" (1.88 m)   PainSc:   4   PainLoc: Abdomen     Physical Exam      General: Alert, oriented x3, NAD  Neuro: No focal deficits; steady gait; cranial nerves II-XII intact  Cardio: RRR, no murmurs  Resp: Clear to auscultation bilaterally  Abdomen: Soft, non-tender  Extremities: No edema  Psych: Mood appropriate, cooperative  Skin: Intact, no ulcers or lesions    Assessment:       1. CVA (cerebrovascular accident due to intracerebral hemorrhage)    2. Hypertension, unspecified type    3. Screening examination for STI    4. Dementia, multiinfarct, without behavioral disturbance    5. Orthostatic hypotension        Plan:       CVA (cerebrovascular accident due to intracerebral hemorrhage) Patient remains neurologically stable.  -     Echo Saline Bubble? Yes; Future  -     Treponema Pallidium Antibodies IgG, IgM; Future; Expected date: 08/01/2024  -     Ambulatory referral/consult to Neurology; Future; Expected date: 08/08/2024    Hypertension, unspecified type  -     Echo Saline Bubble? Yes; Future  -     Treponema Pallidium Antibodies IgG, IgM; Future; Expected date: 08/01/2024    Screening examination for STI  -     Treponema Pallidium Antibodies IgG, IgM; Future; Expected date: 08/01/2024    Dementia, multiinfarct, without behavioral disturbance  -     Ambulatory referral/consult to Neurology; Future; Expected date: 08/08/2024  Memory continues to decline slowly but no impact on daily functioning.  Remains independent; daughter involved in care.  Referral to neurology scheduled.  Continue monitoring cognitive function and safety.       Follow-Up Plan:  Return to clinic in 1-2 months after neurology consultation  Monitor progression of memory, blood pressure, and glucose control  Call sooner for new neurologic symptoms, dizziness, or behavioral changes  Review lab " results when available      Goal BP<140/90  Goal A1C <7.0  Goal BMI <30    A total of 25 minutes were spent face-to-face with the patient during this encounter and over half of that time was spent on counseling and coordination of care. We discussed in depth the importance of adherence diabetic low salt diet and exercise. I also educated the patient about lifestyle modifications which may improve blood pressure.

## 2024-08-09 ENCOUNTER — OFFICE VISIT (OUTPATIENT)
Dept: FAMILY MEDICINE | Facility: HOSPITAL | Age: 64
End: 2024-08-09
Attending: FAMILY MEDICINE
Payer: MEDICAID

## 2024-08-09 VITALS
HEIGHT: 74 IN | HEART RATE: 56 BPM | SYSTOLIC BLOOD PRESSURE: 132 MMHG | DIASTOLIC BLOOD PRESSURE: 79 MMHG | BODY MASS INDEX: 27.73 KG/M2 | WEIGHT: 216.06 LBS

## 2024-08-09 DIAGNOSIS — Z79.4 TYPE 2 DIABETES MELLITUS WITH HYPERGLYCEMIA, WITH LONG-TERM CURRENT USE OF INSULIN: ICD-10-CM

## 2024-08-09 DIAGNOSIS — E11.65 TYPE 2 DIABETES MELLITUS WITH HYPERGLYCEMIA, WITH LONG-TERM CURRENT USE OF INSULIN: ICD-10-CM

## 2024-08-09 DIAGNOSIS — I10 PRIMARY HYPERTENSION: Primary | ICD-10-CM

## 2024-08-09 DIAGNOSIS — R41.3 MEMORY DEFICIT: ICD-10-CM

## 2024-08-09 PROCEDURE — 99214 OFFICE O/P EST MOD 30 MIN: CPT | Performed by: FAMILY MEDICINE

## 2024-08-16 ENCOUNTER — HOSPITAL ENCOUNTER (OUTPATIENT)
Dept: CARDIOLOGY | Facility: HOSPITAL | Age: 64
Discharge: HOME | End: 2024-08-16
Attending: FAMILY MEDICINE
Payer: MEDICAID

## 2024-08-16 VITALS — WEIGHT: 216 LBS | BODY MASS INDEX: 27.72 KG/M2 | HEIGHT: 74 IN

## 2024-08-16 DIAGNOSIS — I10 HYPERTENSION, UNSPECIFIED TYPE: ICD-10-CM

## 2024-08-16 DIAGNOSIS — I61.9 CVA (CEREBROVASCULAR ACCIDENT DUE TO INTRACEREBRAL HEMORRHAGE): ICD-10-CM

## 2024-08-16 PROCEDURE — 93306 TTE W/DOPPLER COMPLETE: CPT

## 2024-08-16 PROCEDURE — 93356 MYOCRD STRAIN IMG SPCKL TRCK: CPT

## 2024-08-17 LAB
APICAL FOUR CHAMBER EJECTION FRACTION: 61 %
APICAL TWO CHAMBER EJECTION FRACTION: 56 %
AV INDEX (PROSTH): 0.68
AV MEAN GRADIENT: 3 MMHG
AV PEAK GRADIENT: 7 MMHG
AV VALVE AREA BY VELOCITY RATIO: 1.97 CM²
AV VALVE AREA: 2.12 CM²
AV VELOCITY RATIO: 0.63
BSA FOR ECHO PROCEDURE: 2.26 M2
CV ECHO LV RWT: 0.31 CM
DOP CALC AO PEAK VEL: 1.29 M/S
DOP CALC AO VTI: 27.8 CM
DOP CALC LVOT AREA: 3.1 CM2
DOP CALC LVOT DIAMETER: 2 CM
DOP CALC LVOT PEAK VEL: 0.81 M/S
DOP CALC LVOT STROKE VOLUME: 59.03 CM3
DOP CALC MV VTI: 32 CM
DOP CALCLVOT PEAK VEL VTI: 18.8 CM
E WAVE DECELERATION TIME: 246.13 MSEC
E/A RATIO: 1.34
E/E' RATIO: 9 M/S
ECHO LV POSTERIOR WALL: 0.89 CM (ref 0.6–1.1)
FRACTIONAL SHORTENING: 37 % (ref 28–44)
GLOBAL LONGITUIDAL STRAIN: 19.2 %
INTERVENTRICULAR SEPTUM: 0.97 CM (ref 0.6–1.1)
LA MAJOR: 5.61 CM
LA MINOR: 5.07 CM
LA WIDTH: 3.9 CM
LEFT ATRIUM AREA SYSTOLIC (APICAL 2 CHAMBER): 23.35 CM2
LEFT ATRIUM AREA SYSTOLIC (APICAL 4 CHAMBER): 21.17 CM2
LEFT ATRIUM SIZE: 3.89 CM
LEFT ATRIUM VOLUME INDEX MOD: 30.6 ML/M2
LEFT ATRIUM VOLUME INDEX: 30.5 ML/M2
LEFT ATRIUM VOLUME MOD: 68.94 CM3
LEFT ATRIUM VOLUME: 68.69 CM3
LEFT INTERNAL DIMENSION IN SYSTOLE: 3.62 CM (ref 2.1–4)
LEFT VENTRICLE DIASTOLIC VOLUME INDEX: 73.36 ML/M2
LEFT VENTRICLE DIASTOLIC VOLUME: 165.07 ML
LEFT VENTRICLE END DIASTOLIC VOLUME APICAL 2 CHAMBER: 106.98 ML
LEFT VENTRICLE END DIASTOLIC VOLUME APICAL 4 CHAMBER: 115.32 ML
LEFT VENTRICLE END SYSTOLIC VOLUME APICAL 2 CHAMBER: 79.99 ML
LEFT VENTRICLE END SYSTOLIC VOLUME APICAL 4 CHAMBER: 58.94 ML
LEFT VENTRICLE MASS INDEX: 94 G/M2
LEFT VENTRICLE SYSTOLIC VOLUME INDEX: 24.5 ML/M2
LEFT VENTRICLE SYSTOLIC VOLUME: 55.04 ML
LEFT VENTRICULAR INTERNAL DIMENSION IN DIASTOLE: 5.78 CM (ref 3.5–6)
LEFT VENTRICULAR MASS: 210.97 G
LV LATERAL E/E' RATIO: 9 M/S
LV SEPTAL E/E' RATIO: 9 M/S
LVED V (TEICH): 165.07 ML
LVES V (TEICH): 55.04 ML
LVOT MG: 1.29 MMHG
LVOT MV: 0.53 CM/S
MV MEAN GRADIENT: 1 MMHG
MV PEAK A VEL: 0.47 M/S
MV PEAK E VEL: 0.63 M/S
MV PEAK GRADIENT: 3 MMHG
MV STENOSIS PRESSURE HALF TIME: 95.62 MS
MV VALVE AREA BY CONTINUITY EQUATION: 1.84 CM2
MV VALVE AREA P 1/2 METHOD: 2.3 CM2
OHS LV EJECTION FRACTION SIMPSONS BIPLANE MOD: 60 %
PISA MRMAX VEL: 4.26 M/S
PISA TR MAX VEL: 2.65 M/S
PV MV: 0.76 M/S
PV PEAK GRADIENT: 6 MMHG
PV PEAK VELOCITY: 1.21 M/S
RA MAJOR: 4.53 CM
RA PRESSURE ESTIMATED: 3 MMHG
RA WIDTH: 3.44 CM
RIGHT VENTRICLE DIASTOLIC BASEL DIMENSION: 3.7 CM
RIGHT VENTRICLE DIASTOLIC MID DIMENSION: 3.3 CM
RV TB RVSP: 6 MMHG
RV TISSUE DOPPLER FREE WALL SYSTOLIC VELOCITY 1 (APICAL 4 CHAMBER VIEW): 11.05 CM/S
SINUS: 3.76 CM
STJ: 3.28 CM
TDI LATERAL: 0.07 M/S
TDI SEPTAL: 0.07 M/S
TDI: 0.07 M/S
TR MAX PG: 28 MMHG
TRICUSPID ANNULAR PLANE SYSTOLIC EXCURSION: 1.41 CM
TV REST PULMONARY ARTERY PRESSURE: 31 MMHG
Z-SCORE OF LEFT VENTRICULAR DIMENSION IN END DIASTOLE: -3.44
Z-SCORE OF LEFT VENTRICULAR DIMENSION IN END SYSTOLE: -2.45

## 2024-08-19 ENCOUNTER — OFFICE VISIT (OUTPATIENT)
Dept: FAMILY MEDICINE | Facility: HOSPITAL | Age: 64
End: 2024-08-19
Attending: FAMILY MEDICINE
Payer: MEDICAID

## 2024-08-19 VITALS
WEIGHT: 219.81 LBS | SYSTOLIC BLOOD PRESSURE: 128 MMHG | BODY MASS INDEX: 28.21 KG/M2 | HEART RATE: 60 BPM | HEIGHT: 74 IN | DIASTOLIC BLOOD PRESSURE: 78 MMHG

## 2024-08-19 DIAGNOSIS — E11.9 TYPE 2 DIABETES MELLITUS WITHOUT COMPLICATION, UNSPECIFIED WHETHER LONG TERM INSULIN USE: ICD-10-CM

## 2024-08-19 DIAGNOSIS — L25.5 DERMATITIS DUE TO PLANTS, INCLUDING POISON IVY, SUMAC, AND OAK: ICD-10-CM

## 2024-08-19 DIAGNOSIS — E11.649 TYPE 2 DIABETES MELLITUS WITH HYPOGLYCEMIA WITHOUT COMA, WITH LONG-TERM CURRENT USE OF INSULIN: Primary | ICD-10-CM

## 2024-08-19 DIAGNOSIS — Z79.4 TYPE 2 DIABETES MELLITUS WITH HYPOGLYCEMIA WITHOUT COMA, WITH LONG-TERM CURRENT USE OF INSULIN: Primary | ICD-10-CM

## 2024-08-19 PROCEDURE — 99214 OFFICE O/P EST MOD 30 MIN: CPT | Performed by: FAMILY MEDICINE

## 2024-08-19 RX ORDER — INSULIN GLARGINE 300 U/ML
20 INJECTION, SOLUTION SUBCUTANEOUS DAILY
Qty: 6 ML | Refills: 3 | Status: SHIPPED | OUTPATIENT
Start: 2024-08-19 | End: 2024-09-16 | Stop reason: SDUPTHER

## 2024-08-19 RX ORDER — BLOOD-GLUCOSE SENSOR
EACH MISCELLANEOUS
Qty: 3 EACH | Refills: 12 | Status: SHIPPED | OUTPATIENT
Start: 2024-08-19 | End: 2025-07-07 | Stop reason: SDUPTHER

## 2024-08-19 RX ORDER — CLOBETASOL PROPIONATE 0.5 MG/G
CREAM TOPICAL 2 TIMES DAILY
Qty: 30 G | Refills: 0 | Status: SHIPPED | OUTPATIENT
Start: 2024-08-19 | End: 2024-11-05

## 2024-08-19 NOTE — PROGRESS NOTES
Progress Note   Family Medicine    Subjective:    Chief Complaint: Results and Rash (LEFT ARM)      History of Present Illness:     Patient ID: Med Sam is a 63 y.o. male presents for multiple issues.     HPI    Mr. Med Sam presents for follow-up of type 2 diabetes and hypertension and reports a new skin rash that began approximately 3-4 days ago. He describes the rash as itchy, red, and slightly raised, located mainly on his forearms. Denies any new exposure to known allergens or irritants such as soaps, detergents, or plants. No fever, weeping, or systemic symptoms. Rash is bothersome but not painful. Had done some yard work recently.  He also notes that the dizziness he previously experienced, likely related to orthostatic hypotension, has resolved completely since discontinuation of Norvasc. He has had no recent syncopal episodes or lightheadedness.  Blood pressure has remained stable at home. He continues Toujeo insulin 20 units daily and uses the Dexcom G7 sensor reliably. No recent episodes of hypoglycemia or hyperglycemia reported.    Review of Systems   Neurological:  Negative for dizziness.   All other systems reviewed and are negative.        The following portions of the patient's history were reviewed and updated as appropriate: allergies, current medications, past family history, past medical history, past social history, past surgical history and problem list.    Past Medical History:   Diagnosis Date    Depression     Dx 3 months ago.    Diabetes mellitus, type 2     Dx at age 46       No family history on file.    Social History     Socioeconomic History    Marital status:    Tobacco Use    Smoking status: Never   Substance and Sexual Activity    Alcohol use: No    Drug use: No     Social Determinants of Health     Financial Resource Strain: Low Risk  (7/2/2024)    Overall Financial Resource Strain (CARDIA)     Difficulty of Paying Living Expenses: Not very hard   Food Insecurity: No  "Food Insecurity (7/2/2024)    Hunger Vital Sign     Worried About Running Out of Food in the Last Year: Never true     Ran Out of Food in the Last Year: Never true   Transportation Needs: No Transportation Needs (11/17/2022)    Received from [facility] - Transportation     Lack of Transportation (Medical): No     Lack of Transportation (Non-Medical): No   Physical Activity: Unknown (7/2/2024)    Exercise Vital Sign     Days of Exercise per Week: 2 days   Stress: No Stress Concern Present (7/2/2024)    [place] [employer] - Occupational Stress Questionnaire     Feeling of Stress : Only a little   Housing Stability: Unknown (7/2/2024)    Housing Stability Vital Sign     Unable to Pay for Housing in the Last Year: No       Past Surgical History:   Procedure Laterality Date    CATARACT EXTRACTION Right     COLONOSCOPY N/A 10/24/2016    Procedure: COLONOSCOPY;  Surgeon: Forrest Damon MD;  Location: [facility];  Service: Endoscopy;  Laterality: N/A;  screening colonoscpy      EYE SURGERY      detached retina right eye       Current Medications[1]    Review of patient's allergies indicates:  No Known Allergies    Social History     Substance and Sexual Activity   Sexual Activity Not on file            8/24/2016     9:00 AM   Fall Risk Assessment   Is the patient at risk for fall? No       The ASCVD Risk score (Jens DK, et al., 2019) failed to calculate for the following reasons:    The patient has a prior MI or stroke diagnosis     Physical Examination    /78 (BP Location: Left arm, Patient Position: Sitting)   Pulse 60   Ht 6' 2" (1.88 m)   Wt 99.7 kg (219 lb 12.8 oz)   BMI 28.22 kg/m²   Wt Readings from Last 3 Encounters:   08/19/24 99.7 kg (219 lb 12.8 oz)   08/16/24 98 kg (216 lb)   08/09/24 98 kg (216 lb 0.8 oz)     BP Readings from Last 3 Encounters:   08/19/24 128/78   08/09/24 132/79   08/01/24 (!) 79/47     Estimated body mass index is 28.22 kg/m² as calculated from " "the following:    Height as of this encounter: 6' 2" (1.88 m).    Weight as of this encounter: 99.7 kg (219 lb 12.8 oz).   Physical Exam  Constitutional:       Appearance: Normal appearance.   Cardiovascular:      Rate and Rhythm: Normal rate and regular rhythm.   Pulmonary:      Effort: Pulmonary effort is normal.      Breath sounds: Normal breath sounds.   Skin:     Comments:   Skin: Erythematous, non-vesicular rash with scattered raised papules on forearms.  No excoriation, drainage, or signs of infection.       Neurological:      General: No focal deficit present.      Mental Status: He is alert and oriented to person, place, and time. Mental status is at baseline.   Psychiatric:         Mood and Affect: Mood normal.         Behavior: Behavior normal.         Thought Content: Thought content normal.         Judgment: Judgment normal.      Laboratory    I have reviewed old labs below:    Lab Results   Component Value Date    WBC 6.68 08/02/2016    HGB 12.9 (L) 08/02/2016    HCT 41.9 08/02/2016     08/02/2016    CHOL 139 07/12/2024    TRIG 139 07/12/2024    HDL 50 07/12/2024     02/09/2017    K 3.4 (L) 02/09/2017     02/09/2017    CREATININE 0.9 02/09/2017    BUN 25 (H) 02/09/2017    CO2 28 02/09/2017    TSH 0.732 07/12/2024    HGBA1C 6.7 (H) 07/12/2024       Lab reviewed by me: labs are reviewed, up to date and normal.     Assessment     1. Type 2 diabetes mellitus with hypoglycemia without coma, with long-term current use of insulin    2. Type 2 diabetes mellitus without complication, unspecified whether long term insulin use    3. Dermatitis due to plants, including poison ivy, sumac, and oak         Plan     Type 2 diabetes mellitus with hypoglycemia without coma, with long-term current use of insulin  -      blood-glucose sensor (DEXCOM G7 SENSOR) Mica; Apply sensor every 10 days  Dispense: 3 each; Refill: 12    Type 2 diabetes mellitus without complication, unspecified whether long term " insulin use  -      TOUJEO SOLOSTAR U-300 INSULIN 300 unit/mL (1.5 mL) InPn pen; Inject 20 Units into the skin once daily.  Dispense: 6 mL; Refill: 3    Dermatitis due to plants, including poison ivy, sumac, and oak  -      clobetasoL (TEMOVATE) 0.05 % cream; Apply topically 2 (two) times daily. for 14 days  Dispense: 30 g; Refill: 0    Dizziness- resolved    Follow up in about 3 months (around 11/19/2024) for Diabetes, Hypertension, Memory issue. for further workup and reassessment if labs and tests obtained are stable or sooner as needed.         Goal BP<140/90  Goal A1C <7.0  Goal BMI <30      I spent a total of 30 minutes on the day of the visit. This includes face to face time and non-face to face time preparing to see the patient (eg, review of tests), obtaining and/or reviewing separately obtained history, documenting clinical information in the electronic or other health record, independently interpreting results and communicating results to the patient/family/caregiver, or care coordinator.     Over half of that time was spent on counseling and coordination of care. We discussed in depth the importance of adherence diabetic low salt diet and exercise. I also educated the patient about lifestyle modifications which may improve blood pressure.      A dictation device was used to produce this document. Use of such devices sometimes results in grammatical errors or replacement of words that sound similarly.        Parveen Reeves M.D.            [1]   Current Outpatient Medications:     dutasteride (AVODART) 0.5 mg capsule, Take 0.5 mg by mouth., Disp: , Rfl:     metFORMIN (GLUCOPHAGE-XR) 750 MG ER 24hr tablet, Take 1 tablet by mouth once daily., Disp: , Rfl:     rosuvastatin (CRESTOR) 40 MG Tab, Take 1 tablet by mouth once daily., Disp: , Rfl:     blood pressure monitor Kit, Check BP daily, Disp: 1 each, Rfl: 0    blood-glucose sensor (DEXCOM G7 SENSOR) Mica, Apply sensor every 10 days, Disp: 3 each, Rfl: 12     "lisinopriL (PRINIVIL,ZESTRIL) 40 MG tablet, 1 tablet Orally Once a day for 90 days, Disp: , Rfl:     pen needle, diabetic 31 gauge x 3/16" Ndle, Inject insulin as directed once daily as directed., Disp: 30 each, Rfl: 11    TOUJEO SOLOSTAR U-300 INSULIN 300 unit/mL (1.5 mL) InPn pen, Inject 20 Units into the skin once daily., Disp: 6 mL, Rfl: 3    "

## 2024-09-16 DIAGNOSIS — E11.9 TYPE 2 DIABETES MELLITUS WITHOUT COMPLICATION, UNSPECIFIED WHETHER LONG TERM INSULIN USE: ICD-10-CM

## 2024-09-16 RX ORDER — INSULIN GLARGINE 300 U/ML
20 INJECTION, SOLUTION SUBCUTANEOUS DAILY
Qty: 6 ML | Refills: 3 | Status: SHIPPED | OUTPATIENT
Start: 2024-09-16 | End: 2025-09-16

## 2024-09-16 RX ORDER — TADALAFIL 10 MG/1
10 TABLET ORAL DAILY PRN
Qty: 90 TABLET | Refills: 1 | Status: SHIPPED | OUTPATIENT
Start: 2024-09-16 | End: 2025-09-16

## 2024-10-09 ENCOUNTER — PATIENT MESSAGE (OUTPATIENT)
Dept: FAMILY MEDICINE | Facility: HOSPITAL | Age: 64
End: 2024-10-09
Payer: MEDICAID

## 2024-10-10 ENCOUNTER — TELEPHONE (OUTPATIENT)
Dept: FAMILY MEDICINE | Facility: HOSPITAL | Age: 64
End: 2024-10-10
Payer: MEDICAID

## 2024-10-10 NOTE — TELEPHONE ENCOUNTER
Msg left with appointment information. ----- Message from Bianca sent at 10/10/2024  3:03 PM CDT -----  Type:  Apt Request     Who Called: Pt   Does the patient know what this is regarding?: requesting a f/u apt   Would the patient rather a call back or a response via [facility]? Call   Best Call Back Number:[phone]   Additional Information:

## 2024-11-05 ENCOUNTER — OFFICE VISIT (OUTPATIENT)
Dept: FAMILY MEDICINE | Facility: HOSPITAL | Age: 64
End: 2024-11-05
Attending: FAMILY MEDICINE
Payer: MEDICAID

## 2024-11-05 VITALS
HEART RATE: 55 BPM | SYSTOLIC BLOOD PRESSURE: 120 MMHG | BODY MASS INDEX: 26.79 KG/M2 | HEIGHT: 74 IN | OXYGEN SATURATION: 100 % | WEIGHT: 208.75 LBS | DIASTOLIC BLOOD PRESSURE: 64 MMHG

## 2024-11-05 DIAGNOSIS — E11.9 TYPE 2 DIABETES MELLITUS WITHOUT COMPLICATION, UNSPECIFIED WHETHER LONG TERM INSULIN USE: ICD-10-CM

## 2024-11-05 DIAGNOSIS — L25.5 DERMATITIS DUE TO PLANTS, INCLUDING POISON IVY, SUMAC, AND OAK: ICD-10-CM

## 2024-11-05 DIAGNOSIS — I10 PRIMARY HYPERTENSION: Primary | ICD-10-CM

## 2024-11-05 PROCEDURE — 99213 OFFICE O/P EST LOW 20 MIN: CPT | Performed by: FAMILY MEDICINE

## 2024-11-05 RX ORDER — LISINOPRIL 40 MG/1
TABLET ORAL
COMMUNITY
Start: 2024-11-03 | End: 2025-03-20 | Stop reason: SDUPTHER

## 2024-11-05 RX ORDER — INSULIN GLARGINE 300 U/ML
20 INJECTION, SOLUTION SUBCUTANEOUS DAILY
Qty: 6 ML | Refills: 3 | Status: SHIPPED | OUTPATIENT
Start: 2024-11-05 | End: 2025-03-23 | Stop reason: SDUPTHER

## 2024-11-05 RX ORDER — ACETAMINOPHEN 500 MG
TABLET ORAL
Qty: 1 EACH | Refills: 0 | Status: SHIPPED | OUTPATIENT
Start: 2024-11-05

## 2024-11-05 NOTE — PROGRESS NOTES
Progress Note   Family Medicine    Subjective:    Chief Complaint: Follow-up      History of Present Illness:     Patient ID: Med Sam is a 64 y.o. male presents for multiple issues.     Follow-up  Pertinent negatives include no chest pain or numbness.       Mr. Sam returns for a scheduled 2-month follow-up for diabetes and hypertension management. He reports continued stability in blood glucose levels, with consistent readings from his Dexcom G7 sensor. No episodes of hypoglycemia or hyperglycemia have occurred. He remains adherent to his Toujeo 20 units daily and reports no side effects from the insulin.    He also confirms complete resolution of the skin rash previously diagnosed as dermatitis. He completed the prescribed course of clobetasol cream with no recurrence of symptoms. No new rashes or skin complaints reported.    He denies dizziness, fatigue, chest pain, palpitations, or visual changes. Blood pressure has remained stable at home, and he has not experienced orthostatic symptoms since discontinuation of Norvasc.    Review of Systems   Respiratory:  Negative for shortness of breath.    Cardiovascular:  Negative for chest pain.   Endocrine: Negative for polydipsia, polyphagia and polyuria.   Neurological:  Negative for dizziness and numbness.         The following portions of the patient's history were reviewed and updated as appropriate: allergies, current medications, past family history, past medical history, past social history, past surgical history and problem list.    Past Medical History:   Diagnosis Date    Depression     Dx 3 months ago.    Diabetes mellitus, type 2     Dx at age 46       No family history on file.    Social History     Socioeconomic History    Marital status:    Tobacco Use    Smoking status: Never   Substance and Sexual Activity    Alcohol use: No    Drug use: No     Social Drivers of Health     Financial Resource Strain: Medium Risk (3/17/2025)    Overall Financial  "Resource Strain (CARDIA)     Difficulty of Paying Living Expenses: Somewhat hard   Food Insecurity: No Food Insecurity (3/17/2025)    Hunger Vital Sign     Worried About Running Out of Food in the Last Year: Never true     Ran Out of Food in the Last Year: Never true   Transportation Needs: No Transportation Needs (3/17/2025)    PRAPARE - Transportation     Lack of Transportation (Medical): No     Lack of Transportation (Non-Medical): No   Physical Activity: Insufficiently Active (3/17/2025)    Exercise Vital Sign     Days of Exercise per Week: 2 days     Minutes of Exercise per Session: 50 min   Stress: Stress Concern Present (3/17/2025)    [place] [employer] - Occupational Stress Questionnaire     Feeling of Stress : To some extent   Housing Stability: Low Risk  (3/17/2025)    Housing Stability Vital Sign     Unable to Pay for Housing in the Last Year: No     Number of Times Moved in the Last Year: 0     Homeless in the Last Year: No       Past Surgical History:   Procedure Laterality Date    CATARACT EXTRACTION Right     COLONOSCOPY N/A 10/24/2016    Procedure: COLONOSCOPY;  Surgeon: Forrest Damon MD;  Location: [facility];  Service: Endoscopy;  Laterality: N/A;  screening colonoscpy      EYE SURGERY      detached retina right eye       Current Medications[1]    Review of patient's allergies indicates:  No Known Allergies    Social History     Substance and Sexual Activity   Sexual Activity Not on file            8/24/2016     9:00 AM   Fall Risk Assessment   Is the patient at risk for fall? No       The ASCVD Risk score (Jens PHILLIPS, et al., 2019) failed to calculate for the following reasons:    Risk score cannot be calculated because patient has a medical history suggesting prior/existing ASCVD     Physical Examination    /64   Pulse (!) 55   Ht 6' 2" (1.88 m)   Wt 94.7 kg (208 lb 12.4 oz)   SpO2 100%   BMI 26.81 kg/m²     BP Readings from Last 3 Encounters:   07/07/25 137/84 "   03/20/25 123/70   11/05/24 120/64        Physical Exam  Constitutional:       Appearance: Normal appearance.   Cardiovascular:      Pulses: Normal pulses.      Heart sounds: Normal heart sounds.   Pulmonary:      Effort: Pulmonary effort is normal.      Breath sounds: Normal breath sounds.   Neurological:      General: No focal deficit present.      Mental Status: He is alert and oriented to person, place, and time.   Psychiatric:         Mood and Affect: Mood normal.         Behavior: Behavior normal.         Thought Content: Thought content normal.         Judgment: Judgment normal.            I have reviewed old labs below:      Assessment     1. Primary hypertension    2. Type 2 diabetes mellitus without complication, unspecified whether long term insulin use    3. Dermatitis due to plants, including poison ivy, sumac, and oak         Plan     Diabetes:  Continue Toujeo 20 units SC daily  Continue using Dexcom G7 sensor, apply every 10 days  No dose changes at this time  Reinforce meal tracking and sensor adherence  A1C and CMP to be repeated at next follow-up (3-month interval)  Hypertension:  Continue Lisinopril 40 mg daily  Norvasc remains off  Encourage continued hydration and blood pressure monitoring  Dermatitis:  Resolved  No need for further dermatologic treatment at this time    Follow up in about 6 months (around 5/5/2025). for further workup and reassessment if labs and tests obtained are stable or sooner as needed.         Goal BP<140/90  Goal A1C <7.0  Goal BMI <30        I spent a total of ** minutes on the day of the visit. This includes face to face time and non-face to face time preparing to see the patient (eg, review of tests), obtaining and/or reviewing separately obtained history, documenting clinical information in the electronic or other health record, independently interpreting results and communicating results to the patient/family/caregiver, or care coordinator.     Over half of that  "time was spent on counseling and coordination of care. We discussed in depth the importance of adherence diabetic low salt diet and exercise. I also educated the patient about lifestyle modifications which may improve blood pressure.      A dictation device was used to produce this document. Use of such devices sometimes results in grammatical errors or replacement of words that sound similarly.        Parveen Reeves M.D.             [1]   Current Outpatient Medications:     dutasteride (AVODART) 0.5 mg capsule, Take 0.5 mg by mouth., Disp: , Rfl:     metFORMIN (GLUCOPHAGE-XR) 750 MG ER 24hr tablet, Take 1 tablet by mouth once daily., Disp: , Rfl:     rosuvastatin (CRESTOR) 40 MG Tab, Take 1 tablet by mouth once daily., Disp: , Rfl:     blood pressure monitor Kit, Check BP daily, Disp: 1 each, Rfl: 0    blood-glucose sensor (DEXCOM G7 SENSOR) Mica, Apply sensor every 10 days, Disp: 3 each, Rfl: 12    lisinopriL (PRINIVIL,ZESTRIL) 40 MG tablet, 1 tablet Orally Once a day for 90 days, Disp: , Rfl:     pen needle, diabetic 31 gauge x 3/16" Ndle, Inject insulin as directed once daily as directed., Disp: 30 each, Rfl: 11    TOUJEO SOLOSTAR U-300 INSULIN 300 unit/mL (1.5 mL) InPn pen, Inject 20 Units into the skin once daily., Disp: 6 mL, Rfl: 3    "

## 2024-12-04 ENCOUNTER — PATIENT MESSAGE (OUTPATIENT)
Dept: FAMILY MEDICINE | Facility: HOSPITAL | Age: 64
End: 2024-12-04
Payer: MEDICAID

## 2025-01-16 DIAGNOSIS — E11.9 TYPE 2 DIABETES MELLITUS WITHOUT COMPLICATION: ICD-10-CM

## 2025-01-16 DIAGNOSIS — E11.65 TYPE 2 DIABETES MELLITUS WITH HYPERGLYCEMIA: ICD-10-CM

## 2025-03-20 ENCOUNTER — OFFICE VISIT (OUTPATIENT)
Dept: FAMILY MEDICINE | Facility: HOSPITAL | Age: 65
End: 2025-03-20
Payer: MEDICAID

## 2025-03-20 VITALS
SYSTOLIC BLOOD PRESSURE: 123 MMHG | BODY MASS INDEX: 24.34 KG/M2 | HEART RATE: 55 BPM | WEIGHT: 189.63 LBS | HEIGHT: 74 IN | DIASTOLIC BLOOD PRESSURE: 70 MMHG | OXYGEN SATURATION: 97 %

## 2025-03-20 DIAGNOSIS — Z79.4 TYPE 2 DIABETES MELLITUS WITH HYPOGLYCEMIA WITHOUT COMA, WITH LONG-TERM CURRENT USE OF INSULIN: ICD-10-CM

## 2025-03-20 DIAGNOSIS — I95.1 ORTHOSTATIC HYPOTENSION: ICD-10-CM

## 2025-03-20 DIAGNOSIS — E11.649 TYPE 2 DIABETES MELLITUS WITH HYPOGLYCEMIA WITHOUT COMA, WITH LONG-TERM CURRENT USE OF INSULIN: ICD-10-CM

## 2025-03-20 DIAGNOSIS — I10 HYPERTENSION, UNSPECIFIED TYPE: ICD-10-CM

## 2025-03-20 DIAGNOSIS — R41.3 MEMORY LOSS OF UNKNOWN CAUSE: Primary | ICD-10-CM

## 2025-03-20 PROCEDURE — 99215 OFFICE O/P EST HI 40 MIN: CPT

## 2025-03-20 RX ORDER — LISINOPRIL 40 MG/1
TABLET ORAL
COMMUNITY

## 2025-03-20 NOTE — PROGRESS NOTES
[facility] Family Medicine  History & Physical    SUBJECTIVE:     Chief Complaint:   Chief Complaint   Patient presents with    Annual Exam       History of Present Illness:  64 y.o. male who  has a past medical history of Depression, DM type II, HTN and CVA who presents with daughter for multiple complaints.    #Memory Deficits  Has been ongoing for the past 10 years. Daughter noticed rapid decline for the past two years. Has issues with reading/writing. Per daughter no short term memory. Has been following up with outpatient Neurology. Has been having issues with establishing care with memory clinic. Patient tried to get disability paperwork completed which was unable to be approved. Patient attempting to appeal decision. Patient endorses he will end this month. Has extensive family history of dementia.    #DM type II  Has CGM. Sugars has been at goal. Most recent A1C 6.7. On insulin long acting 20 units and metformin 750 mg.      #HTN  /79 mmHg. Patient on Lisinopril 40 mg. Has been having dizzy spells in the morning, usually while getting out of bed. Endorses sensation of room spinning around. Denies any fall .     #CVA  Unknown date. MRI with Chronic ischemic change with numerous remote hemorrhages and Left temporal predominant pattern of cerebral volume loss.On Crestor 40 mg.       Allergies:  Review of patient's allergies indicates:  No Known Allergies    Home Medications:  Current Outpatient Medications on File Prior to Visit   Medication Sig    blood pressure monitor Kit Check BP daily    blood-glucose sensor (DEXCOM G7 SENSOR) Miac Apply sensor every 10 days    dutasteride (AVODART) 0.5 mg capsule Take 0.5 mg by mouth.    lisinopriL (PRINIVIL,ZESTRIL) 40 MG tablet 1 tablet Orally Once a day for 90 days    metFORMIN (GLUCOPHAGE-XR) 750 MG ER 24hr tablet Take 1 tablet by mouth once daily.    rosuvastatin (CRESTOR) 40 MG Tab Take 1 tablet by mouth once daily.    pen needle, diabetic (BD ULTRA-FINE MINI PEN  "NEEDLE) 31 gauge x 3/16" Ndle  (Patient not taking: Reported on 8/9/2024)    pen needle, diabetic 31 gauge x 3/16" Ndle BD Ultra-Fine Mini Pen Needle 31 gauge x 3/16" (Patient not taking: Reported on 8/9/2024)     No current facility-administered medications on file prior to visit.       Past Medical History:   Diagnosis Date    Depression     Dx 3 months ago.    Diabetes mellitus, type 2     Dx at age 46     Past Surgical History:   Procedure Laterality Date    CATARACT EXTRACTION Right     COLONOSCOPY N/A 10/24/2016    Procedure: COLONOSCOPY;  Surgeon: Forrest Damon MD;  Location: [facility];  Service: Endoscopy;  Laterality: N/A;  screening colonoscpy      EYE SURGERY      detached retina right eye     No family history on file.  Social History[1]     Review of Systems   Constitutional:  Negative for chills and fever.   HENT:  Negative for congestion and sore throat.    Eyes:  Negative for blurred vision.   Respiratory:  Negative for cough, shortness of breath and wheezing.    Cardiovascular:  Negative for chest pain and leg swelling.   Gastrointestinal:  Negative for abdominal pain, nausea and vomiting.   Genitourinary:  Negative for dysuria.   Musculoskeletal:  Negative for joint pain and myalgias.   Skin:  Negative for rash.   Neurological:  Negative for dizziness and headaches.        OBJECTIVE:     Vital Signs:  Pulse: (!) 55 (03/20/25 1123)  BP: 123/70 (03/20/25 1123)  SpO2: 97 % (03/20/25 1123)    Physical Exam  Constitutional:       Appearance: Normal appearance. He is normal weight.   HENT:      Head: Normocephalic and atraumatic.   Eyes:      Extraocular Movements: Extraocular movements intact.      Pupils: Pupils are equal, round, and reactive to light.   Cardiovascular:      Pulses: Normal pulses.      Heart sounds: Normal heart sounds.   Pulmonary:      Effort: Pulmonary effort is normal.      Breath sounds: Normal breath sounds.   Abdominal:      General: Abdomen is flat. Bowel sounds are normal. "      Palpations: Abdomen is soft.   Musculoskeletal:         General: Normal range of motion.      Cervical back: Normal range of motion and neck supple.   Skin:     General: Skin is warm and dry.   Neurological:      General: No focal deficit present.      Mental Status: He is alert. Mental status is at baseline.   Psychiatric:         Mood and Affect: Mood normal.         Behavior: Behavior normal.         Laboratory:  Hemoglobin A1C   Date Value Ref Range Status   07/12/2024 6.7 (H) 4.0 - 5.6 % Final     Comment:     ADA Screening Guidelines:  5.7-6.4%  Consistent with prediabetes  >or=6.5%  Consistent with diabetes    High levels of fetal hemoglobin interfere with the HbA1C  assay. Heterozygous hemoglobin variants (HbS, HgC, etc)do  not significantly interfere with this assay.   However, presence of multiple variants may affect accuracy.     02/09/2017 7.3 (H) 4.5 - 6.2 % Final     Comment:     According to ADA guidelines, hemoglobin A1C <7.0% represents  optimal control in non-pregnant diabetic patients.  Different  metrics may apply to specific populations.   Standards of Medical Care in Diabetes - 2016.  For the purpose of screening for the presence of diabetes:  <5.7%     Consistent with the absence of diabetes  5.7-6.4%  Consistent with increasing risk for diabetes   (prediabetes)  >or=6.5%  Consistent with diabetes  Currently no consensus exists for use of hemoglobin A1C  for diagnosis of diabetes for children.     11/04/2016 8.0 (H) 4.5 - 6.2 % Final     Comment:     According to ADA guidelines, hemoglobin A1C <7.0% represents  optimal control in non-pregnant diabetic patients.  Different  metrics may apply to specific populations.   Standards of Medical Care in Diabetes - 2016.  For the purpose of screening for the presence of diabetes:  <5.7%     Consistent with the absence of diabetes  5.7-6.4%  Consistent with increasing risk for diabetes   (prediabetes)  >or=6.5%  Consistent with diabetes  Currently  no consensus exists for use of hemoglobin A1C  for diagnosis of diabetes for children.         A/P:  Med was seen today for annual exam.    Diagnoses and all orders for this visit:    Memory loss of unknown cause      -Recommend patient follow up with neurology and memory clinic as scheduled. Advised to provide paperwork for disability during next visit to reapply.    Orthostatic hypotension     -Patient noted to be orthostatic during vitals obtained during clinical visit. Discussed orthostasis may sometime be due to autonomic instability in setting of DM type II  vs Medication. Patient with history of CVA and cognitive impairment requiring BP control. Will maintain current medication as is. Patient encouraged to have adequate hydration. Fall precautions discussed with patient. Plan to lower BG medication to determine wether dizziness improves.     Hypertension, unspecified type     -Chronic. Stable. Will continue current medication regimen for know. May need medication adjustment if symptoms of orthostasis persist or worsen.    Type 2 diabetes mellitus with hypoglycemia without coma, with long-term current use of insulin      -Chronic. Stable. Will decrease long acting insulin to 10 units as patient above goal. Will monitor fasting sugars during next visit. Goal to discontinue insulin if possible.         Follow up in about 1 month (around 4/20/2025), or if symptoms worsen or fail to improve.        Aura Morgan MD  [facility], PGY-3  03/25/2025               [1]   Social History  Tobacco Use    Smoking status: Never   Substance Use Topics    Alcohol use: No    Drug use: No

## 2025-03-23 DIAGNOSIS — E11.9 TYPE 2 DIABETES MELLITUS WITHOUT COMPLICATION, UNSPECIFIED WHETHER LONG TERM INSULIN USE: ICD-10-CM

## 2025-03-24 RX ORDER — INSULIN GLARGINE 300 U/ML
20 INJECTION, SOLUTION SUBCUTANEOUS DAILY
Qty: 6 ML | Refills: 3 | Status: SHIPPED | OUTPATIENT
Start: 2025-03-24 | End: 2026-03-24

## 2025-03-27 ENCOUNTER — PATIENT OUTREACH (OUTPATIENT)
Dept: ADMINISTRATIVE | Facility: HOSPITAL | Age: 65
End: 2025-03-27
Payer: MEDICAID

## 2025-03-27 NOTE — PROGRESS NOTES
Population Health Chart Review & Patient Outreach Details      Additional Pop Health Notes:               Updates Requested / Reviewed:      Updated Care Coordination Note         Health Maintenance Topics Overdue:      VB Score: 2     Hemoglobin A1c  Foot Exam    Influenza Vaccine  Shingles/Zoster Vaccine  RSV Vaccine                  Health Maintenance Topic(s) Outreach Outcomes & Actions Taken:    Eye Exam - Outreach Outcomes & Actions Taken  : Diabetic Eye External Records Uploaded, Care Team & History Updated if Applicable

## 2025-03-29 NOTE — PROGRESS NOTES
I have reviewed the notes, assessments, and/or procedures performed by Dr. Vásquez, I concur with her/his documentation of Med Sam.  Date of Service: 3/20/2025.  Insulin requiring DM. Cerebrovascular disease

## 2025-06-17 ENCOUNTER — TELEPHONE (OUTPATIENT)
Dept: FAMILY MEDICINE | Facility: HOSPITAL | Age: 65
End: 2025-06-17
Payer: MEDICAID

## 2025-06-17 NOTE — TELEPHONE ENCOUNTER
Called patient to let them know we do not have any openings to schedule and we will call them once the schedules open up for the following month.         Copied from CRM #[id number]. Topic: Appointments - Appointment Access  >> Jun 17, 2025 10:30 AM Jyoti wrote:  .Type:  Sooner Apoointment Request    Caller is requesting a sooner appointment.  Caller declined first available appointment listed below.  Caller will not accept being placed on the waitlist and is requesting a message be sent to doctor.  Name of Caller:pt  When is the first available appointment?  Symptoms:check up  Would the patient rather a call back or a response via [facility]? Call back  Best Call Back Number:[phone]  Additional Information:

## 2025-07-07 ENCOUNTER — OFFICE VISIT (OUTPATIENT)
Dept: FAMILY MEDICINE | Facility: HOSPITAL | Age: 65
End: 2025-07-07
Attending: FAMILY MEDICINE
Payer: COMMERCIAL

## 2025-07-07 VITALS
BODY MASS INDEX: 24.34 KG/M2 | DIASTOLIC BLOOD PRESSURE: 84 MMHG | TEMPERATURE: 98 F | WEIGHT: 189.63 LBS | HEART RATE: 58 BPM | RESPIRATION RATE: 19 BRPM | OXYGEN SATURATION: 99 % | HEIGHT: 74 IN | SYSTOLIC BLOOD PRESSURE: 137 MMHG

## 2025-07-07 DIAGNOSIS — Z79.4 TYPE 2 DIABETES MELLITUS WITH HYPOGLYCEMIA WITHOUT COMA, WITH LONG-TERM CURRENT USE OF INSULIN: ICD-10-CM

## 2025-07-07 DIAGNOSIS — E11.649 TYPE 2 DIABETES MELLITUS WITH HYPOGLYCEMIA WITHOUT COMA, WITH LONG-TERM CURRENT USE OF INSULIN: ICD-10-CM

## 2025-07-07 DIAGNOSIS — E11.59 HYPERTENSION ASSOCIATED WITH DIABETES: ICD-10-CM

## 2025-07-07 DIAGNOSIS — I15.2 HYPERTENSION ASSOCIATED WITH DIABETES: ICD-10-CM

## 2025-07-07 DIAGNOSIS — E11.9 TYPE 2 DIABETES MELLITUS WITHOUT COMPLICATION, UNSPECIFIED WHETHER LONG TERM INSULIN USE: Primary | ICD-10-CM

## 2025-07-07 PROCEDURE — 99214 OFFICE O/P EST MOD 30 MIN: CPT | Performed by: FAMILY MEDICINE

## 2025-07-07 RX ORDER — PEN NEEDLE, DIABETIC 30 GX3/16"
NEEDLE, DISPOSABLE MISCELLANEOUS
Qty: 30 EACH | Refills: 11 | Status: SHIPPED | OUTPATIENT
Start: 2025-07-07

## 2025-07-07 RX ORDER — BLOOD-GLUCOSE SENSOR
EACH MISCELLANEOUS
Qty: 3 EACH | Refills: 12 | Status: SHIPPED | OUTPATIENT
Start: 2025-07-07

## 2025-07-07 NOTE — PROGRESS NOTES
"Progress Note       HPI:  Med Sam is a 64-year-old male with a history of type 2 diabetes mellitus (dx age 46), hypertension, hyperlipidemia, benign prostatic hyperplasia, depression (dx 3 months ago), and probable cerebral amyloid angiopathy (CAA) who presents for 3-month follow-up.   He reports overall stability in his physical health but expresses desire to discontinue metformin (Glucophage XR) due to pill burden.     Complains of occasional dizziness when he takes Lisinopril and Avodart together.    He has not experienced recent hypoglycemia or hyperglycemia and remains on Toujeo and using Dexcom for glucose monitoring.    Patient and his daughter report progressive memory decline over recent months. He is increasingly forgetting names and repeating conversations.   There is no disorientation, wandering, or difficulty with navigation, and he remains independent in ADLs.     No recent falls, headaches, or focal neurologic symptoms. Denies urinary complaints or mood changes.    ROS:  Constitutional: No fever, chills, or weight change  Neurologic: Reports memory decline; no dizziness, weakness, or falls  Psychiatric: Mood stable, no suicidal ideation  GI/: No dysuria, hematuria, or incontinence; voiding well  Endocrine: No polydipsia, polyuria, or hypoglycemia reported  CV/Resp: No chest pain or dyspnea    PE:  Vital Signs:  Vitals:    07/07/25 0859   BP: 137/84   Pulse: (!) 58   Resp: 19   Temp: 97.7 °F (36.5 °C)   TempSrc: Oral   SpO2: 99%   Weight: 86 kg (189 lb 9.5 oz)   Height: 6' 2" (1.88 m)    Body mass index is 24.34 kg/m².     General: Alert, oriented x3, in no acute distress  Neuro: Non-focal, steady gait, no tremor, cranial nerves grossly intact  Psych: Appropriate affect, normal behavior  HEENT/Cardiopulmonary/Abd/Ext: Unremarkable  Skin: No lesions, ulcers    Assessment and Plan:  1. Type 2 Diabetes Mellitus with hypoglycemia, with long-term insulin use  Continue Toujeo insulin 20 units " daily.  Hold metformin for 3 months given adequate glycemic control and patient preference.  Continue Dexcom G7 sensor monitoring.  Recheck Hemoglobin A1C in 3 months.  Monitor for signs of hyperglycemia or hypoglycemia.  Supplies ordered:  Pen needle 31G x 3/16, for insulin delivery  Dexcom G7 sensors, apply every 10 days  2. Type 2 Diabetes Mellitus without complication  Orders placed today for:  Hemoglobin A1C (future)  Comprehensive Metabolic Panel (CMP) (future)  Lipid Panel (future)  Urine microalbumin/creatinine ratio (future)  3. Hypertension associated with diabetes  Continue lisinopril 40 mg daily.  Will attempt to take take Avadart and BP meds at different times.  BP controlled at todays visit.  Orders repeated for routine monitoring:  Hemoglobin A1C  CMP  Lipid Panel  Urine microalbumin/creatinine ratio  4. Cognitive Decline / Probable Cerebral Amyloid Angiopathy - Progressive  Memory worsening per patient and daughter.  Continue to monitor; consider referral to neurology or memory clinic if functional status worsens.  Encourage medication adherence and safety precautions.  Follow-up pending results and progression.    Orders Placed Today:  ? Pen needle, diabetic 31G x 3/16 - use with insulin  ? Dexcom G7 sensors - apply every 10 days  ? Hemoglobin A1C - future  ? Lipid Panel - future  ? Comprehensive Metabolic Panel (CMP) - future  ? Microalbumin/creatinine urine ratio - future    Follow-up Plan:  Return to clinic in 3 months for diabetes and memory follow-up  Call sooner for symptoms of hyperglycemia, hypoglycemia, or rapid memory decline